# Patient Record
Sex: FEMALE | Race: WHITE | Employment: OTHER | URBAN - METROPOLITAN AREA
[De-identification: names, ages, dates, MRNs, and addresses within clinical notes are randomized per-mention and may not be internally consistent; named-entity substitution may affect disease eponyms.]

---

## 2019-12-03 ENCOUNTER — HOSPITAL ENCOUNTER (OUTPATIENT)
Dept: PREADMISSION TESTING | Age: 74
Discharge: HOME | End: 2019-12-03
Payer: MEDICARE

## 2019-12-03 VITALS — WEIGHT: 262 LBS | HEIGHT: 68 IN | BODY MASS INDEX: 39.71 KG/M2

## 2019-12-03 LAB
ALBUMIN SERPL-MCNC: 3.8 G/DL (ref 3.4–5)
ALBUMIN/GLOB SERPL: 1.3 {RATIO} (ref 0.8–1.7)
ALP SERPL-CCNC: 85 U/L (ref 45–117)
ALT SERPL-CCNC: 31 U/L (ref 13–56)
ANION GAP SERPL CALC-SCNC: 7 MMOL/L (ref 3–18)
APPEARANCE UR: ABNORMAL
APTT PPP: 31.9 SEC (ref 23–36.4)
AST SERPL-CCNC: 20 U/L (ref 10–38)
ATRIAL RATE: 70 BPM
BACTERIA URNS QL MICRO: ABNORMAL /HPF
BASOPHILS # BLD: 0 K/UL (ref 0–0.1)
BASOPHILS NFR BLD: 1 % (ref 0–2)
BILIRUB SERPL-MCNC: 0.5 MG/DL (ref 0.2–1)
BILIRUB UR QL: NEGATIVE
BUN SERPL-MCNC: 27 MG/DL (ref 7–18)
BUN/CREAT SERPL: 29 (ref 12–20)
CALCIUM SERPL-MCNC: 9.3 MG/DL (ref 8.5–10.1)
CALCULATED P AXIS, ECG09: 39 DEGREES
CALCULATED R AXIS, ECG10: 53 DEGREES
CALCULATED T AXIS, ECG11: 71 DEGREES
CAOX CRY URNS QL MICRO: ABNORMAL
CHLORIDE SERPL-SCNC: 106 MMOL/L (ref 100–111)
CO2 SERPL-SCNC: 28 MMOL/L (ref 21–32)
COLOR UR: YELLOW
CREAT SERPL-MCNC: 0.93 MG/DL (ref 0.6–1.3)
DIAGNOSIS, 93000: NORMAL
DIFFERENTIAL METHOD BLD: ABNORMAL
EOSINOPHIL # BLD: 0.1 K/UL (ref 0–0.4)
EOSINOPHIL NFR BLD: 2 % (ref 0–5)
EPITH CASTS URNS QL MICRO: ABNORMAL /LPF (ref 0–5)
ERYTHROCYTE [DISTWIDTH] IN BLOOD BY AUTOMATED COUNT: 14.6 % (ref 11.6–14.5)
ERYTHROCYTE [SEDIMENTATION RATE] IN BLOOD: 6 MM/HR (ref 0–30)
EST. AVERAGE GLUCOSE BLD GHB EST-MCNC: 117 MG/DL
GLOBULIN SER CALC-MCNC: 3 G/DL (ref 2–4)
GLUCOSE SERPL-MCNC: 91 MG/DL (ref 74–99)
GLUCOSE UR STRIP.AUTO-MCNC: NEGATIVE MG/DL
HBA1C MFR BLD: 5.7 % (ref 4.2–5.6)
HCT VFR BLD AUTO: 46.4 % (ref 35–45)
HGB BLD-MCNC: 14.2 G/DL (ref 12–16)
HGB UR QL STRIP: NEGATIVE
INR PPP: 0.9 (ref 0.8–1.2)
KETONES UR QL STRIP.AUTO: NEGATIVE MG/DL
LEUKOCYTE ESTERASE UR QL STRIP.AUTO: ABNORMAL
LYMPHOCYTES # BLD: 1.8 K/UL (ref 0.9–3.6)
LYMPHOCYTES NFR BLD: 36 % (ref 21–52)
MCH RBC QN AUTO: 26.1 PG (ref 24–34)
MCHC RBC AUTO-ENTMCNC: 30.6 G/DL (ref 31–37)
MCV RBC AUTO: 85.1 FL (ref 74–97)
MONOCYTES # BLD: 0.5 K/UL (ref 0.05–1.2)
MONOCYTES NFR BLD: 10 % (ref 3–10)
NEUTS SEG # BLD: 2.6 K/UL (ref 1.8–8)
NEUTS SEG NFR BLD: 51 % (ref 40–73)
NITRITE UR QL STRIP.AUTO: NEGATIVE
P-R INTERVAL, ECG05: 212 MS
PH UR STRIP: 5 [PH] (ref 5–8)
PLATELET # BLD AUTO: 228 K/UL (ref 135–420)
PMV BLD AUTO: 9.7 FL (ref 9.2–11.8)
POTASSIUM SERPL-SCNC: 4.7 MMOL/L (ref 3.5–5.5)
PROT SERPL-MCNC: 6.8 G/DL (ref 6.4–8.2)
PROT UR STRIP-MCNC: NEGATIVE MG/DL
PROTHROMBIN TIME: 12.4 SEC (ref 11.5–15.2)
Q-T INTERVAL, ECG07: 406 MS
QRS DURATION, ECG06: 92 MS
QTC CALCULATION (BEZET), ECG08: 438 MS
RBC # BLD AUTO: 5.45 M/UL (ref 4.2–5.3)
RBC #/AREA URNS HPF: ABNORMAL /HPF (ref 0–5)
SODIUM SERPL-SCNC: 141 MMOL/L (ref 136–145)
SP GR UR REFRACTOMETRY: 1.03 (ref 1–1.03)
UROBILINOGEN UR QL STRIP.AUTO: 0.2 EU/DL (ref 0.2–1)
VENTRICULAR RATE, ECG03: 70 BPM
WBC # BLD AUTO: 5.1 K/UL (ref 4.6–13.2)
WBC URNS QL MICRO: ABNORMAL /HPF (ref 0–5)

## 2019-12-03 PROCEDURE — 85610 PROTHROMBIN TIME: CPT

## 2019-12-03 PROCEDURE — 85730 THROMBOPLASTIN TIME PARTIAL: CPT

## 2019-12-03 PROCEDURE — 80053 COMPREHEN METABOLIC PANEL: CPT

## 2019-12-03 PROCEDURE — 81001 URINALYSIS AUTO W/SCOPE: CPT

## 2019-12-03 PROCEDURE — 85652 RBC SED RATE AUTOMATED: CPT

## 2019-12-03 PROCEDURE — 85025 COMPLETE CBC W/AUTO DIFF WBC: CPT

## 2019-12-03 PROCEDURE — 83036 HEMOGLOBIN GLYCOSYLATED A1C: CPT

## 2019-12-03 PROCEDURE — 93005 ELECTROCARDIOGRAM TRACING: CPT

## 2019-12-03 RX ORDER — LEVOTHYROXINE SODIUM 175 UG/1
175 TABLET ORAL EVERY OTHER DAY
Status: ON HOLD | COMMUNITY
End: 2019-12-17 | Stop reason: CLARIF

## 2019-12-03 RX ORDER — PHENOL/SODIUM PHENOLATE
20 AEROSOL, SPRAY (ML) MUCOUS MEMBRANE DAILY
COMMUNITY

## 2019-12-03 RX ORDER — SODIUM CHLORIDE, SODIUM LACTATE, POTASSIUM CHLORIDE, CALCIUM CHLORIDE 600; 310; 30; 20 MG/100ML; MG/100ML; MG/100ML; MG/100ML
125 INJECTION, SOLUTION INTRAVENOUS CONTINUOUS
Status: CANCELLED | OUTPATIENT
Start: 2019-12-03

## 2019-12-03 RX ORDER — DONEPEZIL HYDROCHLORIDE 10 MG/1
10 TABLET, FILM COATED ORAL
COMMUNITY

## 2019-12-03 RX ORDER — NORTRIPTYLINE HYDROCHLORIDE 75 MG/1
75 CAPSULE ORAL
COMMUNITY

## 2019-12-03 RX ORDER — CEFAZOLIN SODIUM/WATER 2 G/20 ML
2 SYRINGE (ML) INTRAVENOUS ONCE
Status: CANCELLED | OUTPATIENT
Start: 2019-12-03 | End: 2019-12-03

## 2019-12-03 RX ORDER — GLUCOSAMINE/CHONDR SU A SOD 750-600 MG
1 TABLET ORAL DAILY
COMMUNITY

## 2019-12-03 RX ORDER — SIMVASTATIN 80 MG/1
80 TABLET, FILM COATED ORAL DAILY
COMMUNITY

## 2019-12-03 RX ORDER — LEVOTHYROXINE SODIUM 200 UG/1
200 TABLET ORAL DAILY
COMMUNITY

## 2019-12-03 NOTE — PERIOP NOTES
Pt denies sleep apnea.   \"I had a stress test and an echo, and the results would be with Dr. Loyola Severin office, I don't remember when and where\"

## 2019-12-03 NOTE — PERIOP NOTES
At the time of the PAT interview, patient did not meet special population criteria. Dr Yuliana Collins office called, spoke to [facility], left message to request an updated surgical posting with a MD order for operative procedure without abbreviations.

## 2019-12-05 LAB
BACTERIA SPEC CULT: NORMAL
BACTERIA SPEC CULT: NORMAL
SERVICE CMNT-IMP: NORMAL

## 2019-12-12 NOTE — PERIOP NOTES
Fax over posting sheet tot office requesting new posting sheets without abbreviation. Written as TLIF.

## 2019-12-15 NOTE — H&P
History and Physical    Patient: Hamlet Bird MRN: [id number]  SSN: [id number]    YOB: 1945  Age: 76 y.o. Sex: female      Subjective:      Hamlet Bird is a 76 y.o. female who presents with leg neurogenic pain that has failed conservative management. The patient has severe quality of life disruption and has functional limitations. Past Medical History:   Diagnosis Date    Arthritis     Chronic pain     lower back and left leg    GERD (gastroesophageal reflux disease)     Psychiatric disorder     depression    Stroke ([facility] [address] 2004    mini stroke    Thyroid disease     hypo     Past Surgical History:   Procedure Laterality Date    HX CATARACT REMOVAL Bilateral     HX ORTHOPAEDIC Left 1974    Exc proximal radial head    TOTAL HIP ARTHROPLASTY Left 2019    TOTAL HIP ARTHROPLASTY Right 2017    TOTAL KNEE ARTHROPLASTY Bilateral 2011      No family history on file. Social History     Tobacco Use    Smoking status: Never Smoker    Smokeless tobacco: Never Used   Substance Use Topics    Alcohol use: Yes     Comment: 1 can of beer monthly      Prior to Admission medications    Medication Sig Start Date End Date Taking? Authorizing Provider   simvastatin (ZOCOR) 80 mg tablet Take 80 mg by mouth daily. Provider, Historical   levothyroxine (SYNTHROID) 175 mcg tablet Take 175 mcg by mouth every other day. Provider, Historical   levothyroxine (SYNTHROID) 200 mcg tablet Take 200 mcg by mouth every other day. Provider, Historical   Biotin 2,500 mcg cap Take 1 mg by mouth daily. Provider, Historical   Omeprazole delayed release (PRILOSEC D/R) 20 mg tablet Take 20 mg by mouth daily. Provider, Historical   nortriptyline (PAMELOR) 75 mg capsule Take 75 mg by mouth nightly. Provider, Historical   donepezil (ARICEPT) 10 mg tablet Take 10 mg by mouth nightly. Provider, Historical   multivit with calcium,iron,min [facility] MULTIPLE VITAMINS PO) Take 1 Tab by mouth daily. Provider, Historical        No Known Allergies    Review of Systems:    Uncomplicated. Objective: There were no vitals filed for this visit. Physical Exam:    The patient is alert and cooperative with the exam.  The patient is neurologically intact from L2-S2 and its respective myotomes and dermatomes. Skin is supple and amenable for surgery. CT OF THE LUMBAR SPINE WITHOUT CONTRAST   10/5/2019 11:06 AM       No prior studies the lumbar spine are available for comparison       HISTORY lumbar spondylolisthesis       COMMENTS   Multiple axial images of the lumbar spine are performed with additional sagittal and coronal reconstructions.       There is a grade 1 anterolisthesis of L4 upon L5 by approximately 5 mm. The remaining intervertebral levels demonstrate overall normal alignment. The vertebral body heights are intact. I do not appreciate displaced fracture or subluxation. Vacuum disc phenomenon is noted at all discs of the lumbar spine. There is facet hypertrophy greatest at the L4-5 and L5-S1 levels.       L1-2 and L2-3 do not demonstrate significant central neural foraminal stenosis. L3-4 there is a diffuse disc bulge encroach upon the ventral thecal sac combining with facet hypertrophy to create mild central narrowing   L4-5 there is a grade 1 anterolisthesis with associated disc uncovering. This combines with left greater than right facet hypertrophy creating mild to moderate central and left neural foraminal narrowing. L5-S1 there is a diffuse disc bulge with some calcification encroaching upon the ventral thecal sac. There is mild to moderate bilateral facet hypertrophy                   Assessment:     Lumbar 4-5 spondylolithesis with stenosis    Plan:     In summary, this is a patient with clinical and radiographic evidence of severe lumbar stenosis with instability who has exhausted all non-operative modalities for symptomatic management.  The patient has significant quality of life and functional limitations. I've recommended that the surgical option of an open surgical decompression and instrumented fusion of L4-5. Ive explained the risks and benefits of the operation to the patient at length, including but not limited to surgical site infection (both superficial and deep, potentially requiring reoperation and prolonged antibiotic therapy), neurological injury and dural tears with resultant CSF leakage (short term and long term). Rare medical complications of anesthesia such as MI, VTE, stroke, pneumonia, post-operative delirium, UTI, post-operative blindness and even death. The patient may sufficiently lose enough blood to warrant and blood transfusion. Long-term complications such as stiffness, adjacent joint arthrosis, non-union, hardware failure, recurrent symptoms and failure to resolve symptoms completely. Ive also explained to the patient the post-operative rehabilitation protocol and restrictions to the patient. The patient has understood these risks and has provided me with informed consent FOR Lumbar 4-5 DECOMPRESSION AND INSTRUMENTED FUSION, with left iliac crest bone marrow aspiration.     Signed By: Christian Crowley MD     December 15, 2019

## 2019-12-16 ENCOUNTER — ANESTHESIA EVENT (OUTPATIENT)
Dept: SURGERY | Age: 74
DRG: 455 | End: 2019-12-16
Payer: MEDICARE

## 2019-12-17 ENCOUNTER — APPOINTMENT (OUTPATIENT)
Dept: GENERAL RADIOLOGY | Age: 74
DRG: 455 | End: 2019-12-17
Attending: ORTHOPAEDIC SURGERY
Payer: MEDICARE

## 2019-12-17 ENCOUNTER — HOSPITAL ENCOUNTER (INPATIENT)
Age: 74
LOS: 1 days | Discharge: HOME HEALTH | DRG: 455 | End: 2019-12-18
Attending: ORTHOPAEDIC SURGERY | Admitting: ORTHOPAEDIC SURGERY
Payer: MEDICARE

## 2019-12-17 ENCOUNTER — ANESTHESIA (OUTPATIENT)
Dept: SURGERY | Age: 74
DRG: 455 | End: 2019-12-17
Payer: MEDICARE

## 2019-12-17 DIAGNOSIS — M48.062 LUMBAR STENOSIS WITH NEUROGENIC CLAUDICATION: Primary | ICD-10-CM

## 2019-12-17 LAB
ABO + RH BLD: NORMAL
BLOOD GROUP ANTIBODIES SERPL: NORMAL
SPECIMEN EXP DATE BLD: NORMAL

## 2019-12-17 PROCEDURE — 97161 PT EVAL LOW COMPLEX 20 MIN: CPT

## 2019-12-17 PROCEDURE — 74011250636 HC RX REV CODE- 250/636: Performed by: NURSE ANESTHETIST, CERTIFIED REGISTERED

## 2019-12-17 PROCEDURE — C1713 ANCHOR/SCREW BN/BN,TIS/BN: HCPCS | Performed by: ORTHOPAEDIC SURGERY

## 2019-12-17 PROCEDURE — 77030020407 HC IV BLD WRMR ST 3M -A: Performed by: ANESTHESIOLOGY

## 2019-12-17 PROCEDURE — 74011250636 HC RX REV CODE- 250/636: Performed by: ANESTHESIOLOGY

## 2019-12-17 PROCEDURE — 77030037875 HC DRSG MEPILEX <16IN BORD MOLN -A: Performed by: ORTHOPAEDIC SURGERY

## 2019-12-17 PROCEDURE — 07DR0ZZ EXTRACTION OF ILIAC BONE MARROW, OPEN APPROACH: ICD-10-PCS | Performed by: ORTHOPAEDIC SURGERY

## 2019-12-17 PROCEDURE — 77030020269 HC MISC IMPL: Performed by: ORTHOPAEDIC SURGERY

## 2019-12-17 PROCEDURE — C9290 INJ, BUPIVACAINE LIPOSOME: HCPCS | Performed by: ORTHOPAEDIC SURGERY

## 2019-12-17 PROCEDURE — 74011000258 HC RX REV CODE- 258: Performed by: ORTHOPAEDIC SURGERY

## 2019-12-17 PROCEDURE — 77030029397 HC SHTH SBS BPLR SEALR MEDT -F: Performed by: ORTHOPAEDIC SURGERY

## 2019-12-17 PROCEDURE — 77030011256 HC DRSG MEPILEX <16IN NO BORD MOLN -A: Performed by: ORTHOPAEDIC SURGERY

## 2019-12-17 PROCEDURE — 77030020782 HC GWN BAIR PAWS FLX 3M -B: Performed by: ORTHOPAEDIC SURGERY

## 2019-12-17 PROCEDURE — 77030020262 HC SOL INJ SOD CL 0.9% 100ML: Performed by: ORTHOPAEDIC SURGERY

## 2019-12-17 PROCEDURE — 77030039267 HC ADH SKN EXOFIN S2SG -B: Performed by: ORTHOPAEDIC SURGERY

## 2019-12-17 PROCEDURE — 36415 COLL VENOUS BLD VENIPUNCTURE: CPT

## 2019-12-17 PROCEDURE — 77030031140 HC SUT VCRL3 J&J -A: Performed by: ORTHOPAEDIC SURGERY

## 2019-12-17 PROCEDURE — 77030018836 HC SOL IRR NACL ICUM -A: Performed by: ORTHOPAEDIC SURGERY

## 2019-12-17 PROCEDURE — 77030008477 HC STYL SATN SLP COVD -A: Performed by: ANESTHESIOLOGY

## 2019-12-17 PROCEDURE — 97530 THERAPEUTIC ACTIVITIES: CPT

## 2019-12-17 PROCEDURE — 77030019908 HC STETH ESOPH SIMS -A: Performed by: ANESTHESIOLOGY

## 2019-12-17 PROCEDURE — 77030020268 HC MISC GENERAL SUPPLY: Performed by: ORTHOPAEDIC SURGERY

## 2019-12-17 PROCEDURE — 0SB20ZZ EXCISION OF LUMBAR VERTEBRAL DISC, OPEN APPROACH: ICD-10-PCS | Performed by: ORTHOPAEDIC SURGERY

## 2019-12-17 PROCEDURE — 77030017009 HC DRN WNDE BARD -E: Performed by: ORTHOPAEDIC SURGERY

## 2019-12-17 PROCEDURE — 74011000272 HC RX REV CODE- 272: Performed by: ORTHOPAEDIC SURGERY

## 2019-12-17 PROCEDURE — 76060000039 HC ANESTHESIA 4 TO 4.5 HR: Performed by: ORTHOPAEDIC SURGERY

## 2019-12-17 PROCEDURE — 77030014650 HC SEAL MTRX FLOSEL BAXT -C: Performed by: ORTHOPAEDIC SURGERY

## 2019-12-17 PROCEDURE — 74011000250 HC RX REV CODE- 250: Performed by: ORTHOPAEDIC SURGERY

## 2019-12-17 PROCEDURE — 76210000006 HC OR PH I REC 0.5 TO 1 HR: Performed by: ORTHOPAEDIC SURGERY

## 2019-12-17 PROCEDURE — 77030014007 HC SPNG HEMSTAT J&J -B: Performed by: ORTHOPAEDIC SURGERY

## 2019-12-17 PROCEDURE — 74011250637 HC RX REV CODE- 250/637: Performed by: ORTHOPAEDIC SURGERY

## 2019-12-17 PROCEDURE — 76010000135 HC OR TIME 4 TO 4.5 HR: Performed by: ORTHOPAEDIC SURGERY

## 2019-12-17 PROCEDURE — 77030004402 HC BUR NEUR STRY -C: Performed by: ORTHOPAEDIC SURGERY

## 2019-12-17 PROCEDURE — 77030013079 HC BLNKT BAIR HGGR 3M -A: Performed by: ANESTHESIOLOGY

## 2019-12-17 PROCEDURE — 74011250636 HC RX REV CODE- 250/636: Performed by: ORTHOPAEDIC SURGERY

## 2019-12-17 PROCEDURE — 77030006643: Performed by: ANESTHESIOLOGY

## 2019-12-17 PROCEDURE — 00NY0ZZ RELEASE LUMBAR SPINAL CORD, OPEN APPROACH: ICD-10-PCS | Performed by: ORTHOPAEDIC SURGERY

## 2019-12-17 PROCEDURE — 77030031139 HC SUT VCRL2 J&J -A: Performed by: ORTHOPAEDIC SURGERY

## 2019-12-17 PROCEDURE — 0SG00AJ FUSION OF LUMBAR VERTEBRAL JOINT WITH INTERBODY FUSION DEVICE, POSTERIOR APPROACH, ANTERIOR COLUMN, OPEN APPROACH: ICD-10-PCS | Performed by: ORTHOPAEDIC SURGERY

## 2019-12-17 PROCEDURE — 77030034475 HC MISC IMPL SPN: Performed by: ORTHOPAEDIC SURGERY

## 2019-12-17 PROCEDURE — 72100 X-RAY EXAM L-S SPINE 2/3 VWS: CPT

## 2019-12-17 PROCEDURE — 0SG0071 FUSION OF LUMBAR VERTEBRAL JOINT WITH AUTOLOGOUS TISSUE SUBSTITUTE, POSTERIOR APPROACH, POSTERIOR COLUMN, OPEN APPROACH: ICD-10-PCS | Performed by: ORTHOPAEDIC SURGERY

## 2019-12-17 PROCEDURE — 77030020010 HC FCPS BPLR DISP INLC -E: Performed by: ORTHOPAEDIC SURGERY

## 2019-12-17 PROCEDURE — 65270000029 HC RM PRIVATE

## 2019-12-17 PROCEDURE — 74011000250 HC RX REV CODE- 250: Performed by: NURSE ANESTHETIST, CERTIFIED REGISTERED

## 2019-12-17 PROCEDURE — 77030002916 HC SUT ETHLN J&J -A: Performed by: ORTHOPAEDIC SURGERY

## 2019-12-17 PROCEDURE — 86900 BLOOD TYPING SEROLOGIC ABO: CPT

## 2019-12-17 PROCEDURE — 74011000258 HC RX REV CODE- 258: Performed by: NURSE ANESTHETIST, CERTIFIED REGISTERED

## 2019-12-17 PROCEDURE — 01NB0ZZ RELEASE LUMBAR NERVE, OPEN APPROACH: ICD-10-PCS | Performed by: ORTHOPAEDIC SURGERY

## 2019-12-17 PROCEDURE — 77030002933 HC SUT MCRYL J&J -A: Performed by: ORTHOPAEDIC SURGERY

## 2019-12-17 PROCEDURE — 77030008462 HC STPLR SKN PROX J&J -A: Performed by: ORTHOPAEDIC SURGERY

## 2019-12-17 PROCEDURE — 77030040504 HC DRN WND MDII -B: Performed by: ORTHOPAEDIC SURGERY

## 2019-12-17 PROCEDURE — 77030040361 HC SLV COMPR DVT MDII -B: Performed by: ORTHOPAEDIC SURGERY

## 2019-12-17 DEVICE — GRAFT BNE SUB 5CC 2-4MM GROWTH FACT ALLGRFT OSTEOAMP: Type: IMPLANTABLE DEVICE | Site: SPINE LUMBAR | Status: FUNCTIONAL

## 2019-12-17 DEVICE — GRAFT BNE SUB 15GM GRN CA COMPND PTTY AND SILICATE BASE INJ: Type: IMPLANTABLE DEVICE | Site: SPINE LUMBAR | Status: FUNCTIONAL

## 2019-12-17 DEVICE — GRAFT BNE SUB 7.5GM PTTY SYN SIGNAFUSE: Type: IMPLANTABLE DEVICE | Site: SPINE LUMBAR | Status: FUNCTIONAL

## 2019-12-17 RX ORDER — HYDROMORPHONE HYDROCHLORIDE 1 MG/ML
1 INJECTION, SOLUTION INTRAMUSCULAR; INTRAVENOUS; SUBCUTANEOUS
Status: DISCONTINUED | OUTPATIENT
Start: 2019-12-17 | End: 2019-12-18 | Stop reason: HOSPADM

## 2019-12-17 RX ORDER — DIPHENHYDRAMINE HYDROCHLORIDE 50 MG/ML
12.5 INJECTION, SOLUTION INTRAMUSCULAR; INTRAVENOUS
Status: DISCONTINUED | OUTPATIENT
Start: 2019-12-17 | End: 2019-12-18 | Stop reason: HOSPADM

## 2019-12-17 RX ORDER — CELECOXIB 100 MG/1
200 CAPSULE ORAL 2 TIMES DAILY
Status: DISCONTINUED | OUTPATIENT
Start: 2019-12-17 | End: 2019-12-18 | Stop reason: HOSPADM

## 2019-12-17 RX ORDER — SODIUM CHLORIDE 0.9 % (FLUSH) 0.9 %
5-40 SYRINGE (ML) INJECTION AS NEEDED
Status: DISCONTINUED | OUTPATIENT
Start: 2019-12-17 | End: 2019-12-18 | Stop reason: HOSPADM

## 2019-12-17 RX ORDER — EPHEDRINE SULFATE/0.9% NACL/PF 50 MG/5 ML
SYRINGE (ML) INTRAVENOUS AS NEEDED
Status: DISCONTINUED | OUTPATIENT
Start: 2019-12-17 | End: 2019-12-17 | Stop reason: HOSPADM

## 2019-12-17 RX ORDER — ALBUTEROL SULFATE 0.83 MG/ML
2.5 SOLUTION RESPIRATORY (INHALATION) AS NEEDED
Status: DISCONTINUED | OUTPATIENT
Start: 2019-12-17 | End: 2019-12-17 | Stop reason: HOSPADM

## 2019-12-17 RX ORDER — PHENOL/SODIUM PHENOLATE
20 AEROSOL, SPRAY (ML) MUCOUS MEMBRANE DAILY
Status: DISCONTINUED | OUTPATIENT
Start: 2019-12-18 | End: 2019-12-17 | Stop reason: CLARIF

## 2019-12-17 RX ORDER — OXYCODONE AND ACETAMINOPHEN 5; 325 MG/1; MG/1
1 TABLET ORAL AS NEEDED
Status: DISCONTINUED | OUTPATIENT
Start: 2019-12-17 | End: 2019-12-17 | Stop reason: HOSPADM

## 2019-12-17 RX ORDER — HYDROMORPHONE HYDROCHLORIDE 2 MG/ML
0.5 INJECTION, SOLUTION INTRAMUSCULAR; INTRAVENOUS; SUBCUTANEOUS
Status: DISCONTINUED | OUTPATIENT
Start: 2019-12-17 | End: 2019-12-17 | Stop reason: HOSPADM

## 2019-12-17 RX ORDER — FENTANYL CITRATE 50 UG/ML
INJECTION, SOLUTION INTRAMUSCULAR; INTRAVENOUS AS NEEDED
Status: DISCONTINUED | OUTPATIENT
Start: 2019-12-17 | End: 2019-12-17 | Stop reason: HOSPADM

## 2019-12-17 RX ORDER — PROPOFOL 10 MG/ML
INJECTION, EMULSION INTRAVENOUS AS NEEDED
Status: DISCONTINUED | OUTPATIENT
Start: 2019-12-17 | End: 2019-12-17 | Stop reason: HOSPADM

## 2019-12-17 RX ORDER — ACETAMINOPHEN 325 MG/1
650 TABLET ORAL
Status: DISCONTINUED | OUTPATIENT
Start: 2019-12-17 | End: 2019-12-18 | Stop reason: HOSPADM

## 2019-12-17 RX ORDER — SODIUM CHLORIDE 0.9 % (FLUSH) 0.9 %
5-40 SYRINGE (ML) INJECTION EVERY 8 HOURS
Status: DISCONTINUED | OUTPATIENT
Start: 2019-12-17 | End: 2019-12-18 | Stop reason: HOSPADM

## 2019-12-17 RX ORDER — NALOXONE HYDROCHLORIDE 0.4 MG/ML
0.2 INJECTION, SOLUTION INTRAMUSCULAR; INTRAVENOUS; SUBCUTANEOUS AS NEEDED
Status: DISCONTINUED | OUTPATIENT
Start: 2019-12-17 | End: 2019-12-17 | Stop reason: HOSPADM

## 2019-12-17 RX ORDER — CEFAZOLIN SODIUM/WATER 2 G/20 ML
2 SYRINGE (ML) INTRAVENOUS EVERY 8 HOURS
Status: COMPLETED | OUTPATIENT
Start: 2019-12-17 | End: 2019-12-18

## 2019-12-17 RX ORDER — SIMVASTATIN 40 MG/1
80 TABLET, FILM COATED ORAL DAILY
Status: DISCONTINUED | OUTPATIENT
Start: 2019-12-18 | End: 2019-12-18 | Stop reason: HOSPADM

## 2019-12-17 RX ORDER — DEXAMETHASONE SODIUM PHOSPHATE 4 MG/ML
INJECTION, SOLUTION INTRA-ARTICULAR; INTRALESIONAL; INTRAMUSCULAR; INTRAVENOUS; SOFT TISSUE AS NEEDED
Status: DISCONTINUED | OUTPATIENT
Start: 2019-12-17 | End: 2019-12-17 | Stop reason: HOSPADM

## 2019-12-17 RX ORDER — ROCURONIUM BROMIDE 10 MG/ML
INJECTION, SOLUTION INTRAVENOUS AS NEEDED
Status: DISCONTINUED | OUTPATIENT
Start: 2019-12-17 | End: 2019-12-17 | Stop reason: HOSPADM

## 2019-12-17 RX ORDER — NORTRIPTYLINE HYDROCHLORIDE 25 MG/1
75 CAPSULE ORAL
Status: DISCONTINUED | OUTPATIENT
Start: 2019-12-17 | End: 2019-12-18 | Stop reason: HOSPADM

## 2019-12-17 RX ORDER — PROMETHAZINE HYDROCHLORIDE 25 MG/1
25 TABLET ORAL
Status: DISCONTINUED | OUTPATIENT
Start: 2019-12-17 | End: 2019-12-18 | Stop reason: HOSPADM

## 2019-12-17 RX ORDER — PROPOFOL 10 MG/ML
INJECTION, EMULSION INTRAVENOUS
Status: DISCONTINUED | OUTPATIENT
Start: 2019-12-17 | End: 2019-12-17 | Stop reason: HOSPADM

## 2019-12-17 RX ORDER — MIDAZOLAM HYDROCHLORIDE 1 MG/ML
INJECTION, SOLUTION INTRAMUSCULAR; INTRAVENOUS AS NEEDED
Status: DISCONTINUED | OUTPATIENT
Start: 2019-12-17 | End: 2019-12-17 | Stop reason: HOSPADM

## 2019-12-17 RX ORDER — SODIUM CHLORIDE, SODIUM LACTATE, POTASSIUM CHLORIDE, CALCIUM CHLORIDE 600; 310; 30; 20 MG/100ML; MG/100ML; MG/100ML; MG/100ML
125 INJECTION, SOLUTION INTRAVENOUS CONTINUOUS
Status: DISCONTINUED | OUTPATIENT
Start: 2019-12-17 | End: 2019-12-18 | Stop reason: HOSPADM

## 2019-12-17 RX ORDER — SODIUM CHLORIDE, SODIUM LACTATE, POTASSIUM CHLORIDE, CALCIUM CHLORIDE 600; 310; 30; 20 MG/100ML; MG/100ML; MG/100ML; MG/100ML
1000 INJECTION, SOLUTION INTRAVENOUS CONTINUOUS
Status: DISCONTINUED | OUTPATIENT
Start: 2019-12-17 | End: 2019-12-17 | Stop reason: HOSPADM

## 2019-12-17 RX ORDER — DIPHENHYDRAMINE HYDROCHLORIDE 50 MG/ML
12.5 INJECTION, SOLUTION INTRAMUSCULAR; INTRAVENOUS
Status: DISCONTINUED | OUTPATIENT
Start: 2019-12-17 | End: 2019-12-17 | Stop reason: HOSPADM

## 2019-12-17 RX ORDER — LIDOCAINE HYDROCHLORIDE 20 MG/ML
INJECTION, SOLUTION EPIDURAL; INFILTRATION; INTRACAUDAL; PERINEURAL AS NEEDED
Status: DISCONTINUED | OUTPATIENT
Start: 2019-12-17 | End: 2019-12-17 | Stop reason: HOSPADM

## 2019-12-17 RX ORDER — CYCLOBENZAPRINE HCL 10 MG
5 TABLET ORAL
Status: DISCONTINUED | OUTPATIENT
Start: 2019-12-17 | End: 2019-12-18 | Stop reason: HOSPADM

## 2019-12-17 RX ORDER — LEVOTHYROXINE SODIUM 100 UG/1
200 TABLET ORAL DAILY
Status: DISCONTINUED | OUTPATIENT
Start: 2019-12-18 | End: 2019-12-18 | Stop reason: HOSPADM

## 2019-12-17 RX ORDER — PANTOPRAZOLE SODIUM 40 MG/1
40 TABLET, DELAYED RELEASE ORAL
Status: DISCONTINUED | OUTPATIENT
Start: 2019-12-18 | End: 2019-12-18 | Stop reason: HOSPADM

## 2019-12-17 RX ORDER — ONDANSETRON 2 MG/ML
INJECTION INTRAMUSCULAR; INTRAVENOUS AS NEEDED
Status: DISCONTINUED | OUTPATIENT
Start: 2019-12-17 | End: 2019-12-17 | Stop reason: HOSPADM

## 2019-12-17 RX ORDER — GABAPENTIN 100 MG/1
100 CAPSULE ORAL 3 TIMES DAILY
Status: DISCONTINUED | OUTPATIENT
Start: 2019-12-17 | End: 2019-12-18 | Stop reason: HOSPADM

## 2019-12-17 RX ORDER — DONEPEZIL HYDROCHLORIDE 5 MG/1
10 TABLET, FILM COATED ORAL
Status: DISCONTINUED | OUTPATIENT
Start: 2019-12-17 | End: 2019-12-18 | Stop reason: HOSPADM

## 2019-12-17 RX ORDER — FLUMAZENIL 0.1 MG/ML
0.2 INJECTION INTRAVENOUS
Status: DISCONTINUED | OUTPATIENT
Start: 2019-12-17 | End: 2019-12-17 | Stop reason: HOSPADM

## 2019-12-17 RX ORDER — NALOXONE HYDROCHLORIDE 0.4 MG/ML
0.4 INJECTION, SOLUTION INTRAMUSCULAR; INTRAVENOUS; SUBCUTANEOUS AS NEEDED
Status: DISCONTINUED | OUTPATIENT
Start: 2019-12-17 | End: 2019-12-18 | Stop reason: HOSPADM

## 2019-12-17 RX ORDER — OXYCODONE AND ACETAMINOPHEN 5; 325 MG/1; MG/1
1 TABLET ORAL
Status: DISCONTINUED | OUTPATIENT
Start: 2019-12-17 | End: 2019-12-18 | Stop reason: HOSPADM

## 2019-12-17 RX ORDER — CEFAZOLIN SODIUM/WATER 2 G/20 ML
2 SYRINGE (ML) INTRAVENOUS ONCE
Status: COMPLETED | OUTPATIENT
Start: 2019-12-17 | End: 2019-12-17

## 2019-12-17 RX ORDER — FENTANYL CITRATE 50 UG/ML
25 INJECTION, SOLUTION INTRAMUSCULAR; INTRAVENOUS AS NEEDED
Status: DISCONTINUED | OUTPATIENT
Start: 2019-12-17 | End: 2019-12-17 | Stop reason: HOSPADM

## 2019-12-17 RX ORDER — ACETAMINOPHEN 10 MG/ML
1000 INJECTION, SOLUTION INTRAVENOUS ONCE
Status: COMPLETED | OUTPATIENT
Start: 2019-12-17 | End: 2019-12-17

## 2019-12-17 RX ORDER — DEXAMETHASONE SODIUM PHOSPHATE 4 MG/ML
4 INJECTION, SOLUTION INTRA-ARTICULAR; INTRALESIONAL; INTRAMUSCULAR; INTRAVENOUS; SOFT TISSUE EVERY 8 HOURS
Status: COMPLETED | OUTPATIENT
Start: 2019-12-17 | End: 2019-12-18

## 2019-12-17 RX ADMIN — OXYCODONE HYDROCHLORIDE AND ACETAMINOPHEN 1 TABLET: 5; 325 TABLET ORAL at 17:12

## 2019-12-17 RX ADMIN — Medication 15 MG: at 08:10

## 2019-12-17 RX ADMIN — PHENYLEPHRINE HYDROCHLORIDE 100 MCG: 10 INJECTION INTRAVENOUS at 08:05

## 2019-12-17 RX ADMIN — DEXAMETHASONE SODIUM PHOSPHATE 4 MG: 4 INJECTION, SOLUTION INTRAMUSCULAR; INTRAVENOUS at 23:25

## 2019-12-17 RX ADMIN — FENTANYL CITRATE 50 MCG: 50 INJECTION, SOLUTION INTRAMUSCULAR; INTRAVENOUS at 08:50

## 2019-12-17 RX ADMIN — CELECOXIB 200 MG: 100 CAPSULE ORAL at 20:58

## 2019-12-17 RX ADMIN — HYDROMORPHONE HYDROCHLORIDE 0.5 MG: 2 INJECTION, SOLUTION INTRAMUSCULAR; INTRAVENOUS; SUBCUTANEOUS at 11:53

## 2019-12-17 RX ADMIN — HYDROMORPHONE HYDROCHLORIDE 0.5 MG: 2 INJECTION, SOLUTION INTRAMUSCULAR; INTRAVENOUS; SUBCUTANEOUS at 12:03

## 2019-12-17 RX ADMIN — GABAPENTIN 100 MG: 100 CAPSULE ORAL at 21:02

## 2019-12-17 RX ADMIN — PHENYLEPHRINE HYDROCHLORIDE 200 MCG: 10 INJECTION INTRAVENOUS at 08:36

## 2019-12-17 RX ADMIN — Medication 10 ML: at 23:26

## 2019-12-17 RX ADMIN — PROPOFOL 125 MCG/KG/MIN: 10 INJECTION, EMULSION INTRAVENOUS at 07:46

## 2019-12-17 RX ADMIN — Medication 10 MG: at 08:15

## 2019-12-17 RX ADMIN — DEXAMETHASONE SODIUM PHOSPHATE 4 MG: 4 INJECTION, SOLUTION INTRAMUSCULAR; INTRAVENOUS at 15:28

## 2019-12-17 RX ADMIN — Medication 3 G: at 07:44

## 2019-12-17 RX ADMIN — ACETAMINOPHEN 1000 MG: 10 INJECTION, SOLUTION INTRAVENOUS at 07:46

## 2019-12-17 RX ADMIN — Medication 10 MG: at 08:12

## 2019-12-17 RX ADMIN — SODIUM CHLORIDE, SODIUM LACTATE, POTASSIUM CHLORIDE, AND CALCIUM CHLORIDE 125 ML/HR: 600; 310; 30; 20 INJECTION, SOLUTION INTRAVENOUS at 21:06

## 2019-12-17 RX ADMIN — SODIUM CHLORIDE, SODIUM LACTATE, POTASSIUM CHLORIDE, AND CALCIUM CHLORIDE 125 ML/HR: 600; 310; 30; 20 INJECTION, SOLUTION INTRAVENOUS at 06:35

## 2019-12-17 RX ADMIN — NORTRIPTYLINE HYDROCHLORIDE 75 MG: 25 CAPSULE ORAL at 21:02

## 2019-12-17 RX ADMIN — PHENYLEPHRINE HYDROCHLORIDE 100 MCG: 10 INJECTION INTRAVENOUS at 07:50

## 2019-12-17 RX ADMIN — Medication 15 MG: at 08:19

## 2019-12-17 RX ADMIN — Medication 30 MG: at 07:37

## 2019-12-17 RX ADMIN — MIDAZOLAM 2 MG: 1 INJECTION INTRAMUSCULAR; INTRAVENOUS at 07:28

## 2019-12-17 RX ADMIN — PHENYLEPHRINE HYDROCHLORIDE 200 MCG: 10 INJECTION INTRAVENOUS at 08:33

## 2019-12-17 RX ADMIN — SUGAMMADEX 200 MG: 100 INJECTION, SOLUTION INTRAVENOUS at 10:46

## 2019-12-17 RX ADMIN — DONEPEZIL HYDROCHLORIDE 10 MG: 5 TABLET, FILM COATED ORAL at 21:02

## 2019-12-17 RX ADMIN — DEXAMETHASONE SODIUM PHOSPHATE 8 MG: 4 INJECTION, SOLUTION INTRAMUSCULAR; INTRAVENOUS at 08:10

## 2019-12-17 RX ADMIN — OXYCODONE HYDROCHLORIDE AND ACETAMINOPHEN 1 TABLET: 5; 325 TABLET ORAL at 23:25

## 2019-12-17 RX ADMIN — GABAPENTIN 100 MG: 100 CAPSULE ORAL at 15:28

## 2019-12-17 RX ADMIN — Medication 2 G: at 23:25

## 2019-12-17 RX ADMIN — Medication 2 G: at 15:28

## 2019-12-17 RX ADMIN — PHENYLEPHRINE HYDROCHLORIDE 200 MCG: 10 INJECTION INTRAVENOUS at 09:47

## 2019-12-17 RX ADMIN — PHENYLEPHRINE HYDROCHLORIDE 100 MCG: 10 INJECTION INTRAVENOUS at 07:53

## 2019-12-17 RX ADMIN — SODIUM CHLORIDE, SODIUM LACTATE, POTASSIUM CHLORIDE, AND CALCIUM CHLORIDE: 600; 310; 30; 20 INJECTION, SOLUTION INTRAVENOUS at 08:52

## 2019-12-17 RX ADMIN — PROPOFOL 150 MG: 10 INJECTION, EMULSION INTRAVENOUS at 07:36

## 2019-12-17 RX ADMIN — LIDOCAINE HYDROCHLORIDE 100 MG: 20 INJECTION, SOLUTION EPIDURAL; INFILTRATION; INTRACAUDAL; PERINEURAL at 07:35

## 2019-12-17 RX ADMIN — HYDROMORPHONE HYDROCHLORIDE 0.5 MG: 2 INJECTION, SOLUTION INTRAMUSCULAR; INTRAVENOUS; SUBCUTANEOUS at 12:13

## 2019-12-17 RX ADMIN — TRANEXAMIC ACID 1 G: 100 INJECTION, SOLUTION INTRAVENOUS at 10:34

## 2019-12-17 RX ADMIN — FENTANYL CITRATE 50 MCG: 50 INJECTION, SOLUTION INTRAMUSCULAR; INTRAVENOUS at 11:05

## 2019-12-17 RX ADMIN — ONDANSETRON HYDROCHLORIDE 4 MG: 2 INJECTION INTRAMUSCULAR; INTRAVENOUS at 10:34

## 2019-12-17 RX ADMIN — FENTANYL CITRATE 100 MCG: 50 INJECTION, SOLUTION INTRAMUSCULAR; INTRAVENOUS at 07:34

## 2019-12-17 RX ADMIN — TRANEXAMIC ACID 1 G: 100 INJECTION, SOLUTION INTRAVENOUS at 08:05

## 2019-12-17 RX ADMIN — HYDROMORPHONE HYDROCHLORIDE 1 MG: 1 INJECTION, SOLUTION INTRAMUSCULAR; INTRAVENOUS; SUBCUTANEOUS at 14:07

## 2019-12-17 NOTE — PROGRESS NOTES
1315  Patient arrived to the floor from PACU in satisfactory condition. Dsg CDI, [place]. VS stable. Pain voiced at 8/10, will medicate per [facility] IS educated and encouraged. Shift POC reviewed with patient. Menu/meal times explained. Call/bell phone within reach. Will monitor for changes. 2000 Bedside and Verbal shift change report given to Jona Pope RN  (oncoming nurse) by Adiel Andino RN (offgoing nurse). Report included the following information SBAR, Kardex, MAR, Recent Results and Med Rec Status.

## 2019-12-17 NOTE — PERIOP NOTES
Patient assigned to room 202. [facility] RN to read [employer].

## 2019-12-17 NOTE — PROGRESS NOTES
Problem: Mobility Impaired (Adult and Pediatric)  Goal: *Acute Goals and Plan of Care (Insert Text)  Description  In 1-7 days pt will be able to perform:  STG  1. Bed mobility:  Demonstrate proper log-roll technique for safe initiation of rolling for OOB activities. 2.  Supine to sit to supine S with HR for meals. 3.  Sit to stand to sit S with RW/LSO in prep for ambulation. LT.  Gait:  Ambulate 150ft S with RW/LSO, for home/community mobility. 2.  Stair Negotiation:  Ascend/descend >3 steps CGA with HR for home entry. 3.  Activity tolerance: Tolerate up in chair 30 minutes-1 hour for ADLs. 4.  Patient/Family Education:  Patient/family to be independent with HEP for follow-up care and safe discharge. Note:   PHYSICAL THERAPY EVALUATION    Patient: Juana Hudson (84 y.o. female)  Date: 2019  Primary Diagnosis: Lumbar stenosis with neurogenic claudication [M48.062]  Procedure(s) (LRB):  SPINE DECOMPRESSION AND FUSION OF LUMBAR 4- LUMBAR 5 WITH TRANSFORAMINAL LUMBAR INTERBODY FUSION AND LEFT ILIAC CREST BONE MARROW ASPIRATION WITH NEUROMONITORING WITH C-ARM (N/A) Day of Surgery   Precautions:   Fall, Spinal, Back    ASSESSMENT :  Based on the objective data described below, the patient presents with decreased functional mobility and independence in regard to bed mobility, transfers, gt quality and tolerance, generalized strength, pain, balance, activity tolerance, stair negotiation and safety due to recent back surgery. Pt rating pain on numerical pain scale 2/10. Pt ed regarding log roll techniques and back precautions. Pt required CGA/min A for supine<>sidelying<>sit<>stand. Pt required vc for safe techniques. Pt able to participate in gt training w/ RW, WBAT, GB and CGA in hallway w/ antalgic pattern. Pt able to void on commode and perform own hygiene. Pt returned to supine in bed w/ all needs within reach, SCDs applied. Nurse Juan aware and visitor present.   Recommend [facility] upon hospital d/c. Patient will benefit from skilled intervention to address the above impairments. Patients rehabilitation potential is considered to be Good  Factors which may influence rehabilitation potential include:   []         None noted  []         Mental ability/status  []         Medical condition  []         Home/family situation and support systems  []         Safety awareness  [x]         Pain tolerance/management  []         Other:      PLAN :  Recommendations and Planned Interventions:  [x]           Bed Mobility Training             []    Neuromuscular Re-Education  [x]           Transfer Training                   []    Orthotic/Prosthetic Training  [x]           Gait Training                          []    Modalities  [x]           Therapeutic Exercises          []    Edema Management/Control  [x]           Therapeutic Activities            [x]    Patient and Family Training/Education  []           Other (comment):    Frequency/Duration: Patient will be followed by physical therapy twice daily to address goals. Discharge Recommendations: Home Health  Further Equipment Recommendations for Discharge: N/A     SUBJECTIVE:   Patient stated I am doing okay.     OBJECTIVE DATA SUMMARY:     Past Medical History:   Diagnosis Date    Arthritis     Chronic pain     lower back and left leg    GERD (gastroesophageal reflux disease)     Psychiatric disorder     depression    [facility]    mini stroke    Thyroid disease     hypo     Past Surgical History:   Procedure Laterality Date    HX CATARACT REMOVAL Bilateral     HX ORTHOPAEDIC Left 1974    Exc proximal radial head    TOTAL HIP ARTHROPLASTY Left 2019    TOTAL HIP ARTHROPLASTY Right 2017    TOTAL KNEE ARTHROPLASTY Bilateral 2011     Barriers to Learning/Limitations: None  Compensate with: visual, verbal, tactile, kinesthetic cues/model  Prior Level of Function/Home Situation:   Home Situation  Home Environment: Private residence  # Steps to Enter: 4  Rails to Enter: Yes  Hand Rails : Bilateral  One/Two Story Residence: One story  Living Alone: No  Support Systems: Spouse/Significant Other/Partner  Patient Expects to be Discharged to[de-identified] Private residence  Current DME Used/Available at Home: Cane, straight, Grab bars, Walker, rolling, Shower chair, Raised toilet seat  Critical Behavior:  Neurologic State: Alert; Appropriate for age  Orientation Level: Oriented X4  Cognition: Appropriate decision making; Appropriate for age attention/concentration; Appropriate safety awareness; Follows commands  Safety/Judgement: Awareness of environment  Psychosocial  Patient Behaviors: Calm; Cooperative  Skin Condition/Temp: Dry;Warm  Skin Integrity: Incision (comment)(back )  Skin Integumentary  Skin Color: Appropriate for ethnicity  Skin Condition/Temp: Dry;Warm  Skin Integrity: Incision (comment)(back )  Hair Growth: Absent  Varicosities: Present  Strength:    Strength: Generally decreased, functional  Tone & Sensation:   Tone: Normal  Sensation: Intact  Range Of Motion:  AROM: Generally decreased, functional  Functional Mobility:  Bed Mobility:  Rolling: Contact guard assistance(vc)  Supine to Sit: Contact guard assistance(vc)  Sit to Supine: Contact guard assistance;Minimum assistance; Additional time(vc)  Scooting: Contact guard assistance(vc)  Transfers:  Sit to Stand: Contact guard assistance(vc)  Stand to Sit: Contact guard assistance(vc)  Balance:   Sitting: Intact  Standing: Intact; With support  Ambulation/Gait Training:  Distance (ft): 5 Feet (ft)  Assistive Device: Walker, rolling  Ambulation - Level of Assistance: Contact guard assistance(vc)  Gait Abnormalities: Antalgic;Decreased step clearance  Base of Support: Widened  Stance: Weight shift;Time  Speed/Milly: Slow  Step Length: Left shortened;Right shortened  Swing Pattern: Left asymmetrical;Right asymmetrical  Interventions: Safety awareness training; Tactile cues; Verbal cues; Visual/Demos  Therapeutic Exercises: Pain:  Pain Scale 1: Numeric (0 - 10)  Pain Intensity 1: 2  Pain Location 1: Back  Pain Intervention(s) 1: Medication (see MAR)  Activity Tolerance:   Fair   Please refer to the flowsheet for vital signs taken during this treatment. After treatment:   []         Patient left in no apparent distress sitting up in chair  [x]         Patient left in no apparent distress in bed  [x]         Call bell left within reach  [x]         Nursing notified  [x]         Caregiver present  []         Bed alarm activated    COMMUNICATION/EDUCATION:   [x]         Fall prevention education was provided and the patient/caregiver indicated understanding. [x]         Patient/family have participated as able in goal setting and plan of care. [x]         Patient/family agree to work toward stated goals and plan of care. []         Patient understands intent and goals of therapy, but is neutral about his/her participation. []         Patient is unable to participate in goal setting and plan of care.     Thank you for this referral.  Dhruv Serrano, PT   Time Calculation: 23 mins       Eval Complexity: History: HIGH Complexity :3+ comorbidities / personal factors will impact the outcome/ POC Exam:MEDIUM Complexity : 3 Standardized tests and measures addressing body structure, function, activity limitation and / or participation in recreation  Presentation: LOW Complexity : Stable, uncomplicated  Clinical Decision Making:Low Complexity    Overall Complexity:LOW

## 2019-12-17 NOTE — ANESTHESIA POSTPROCEDURE EVALUATION
Post-Anesthesia Evaluation and Assessment    Cardiovascular Function/Vital Signs  Visit Vitals  /69   Pulse 86   Temp 36.9 °C (98.4 °F)   Resp 12   Ht 5' 8\" (1.727 m)   Wt 118.2 kg (260 lb 8 oz)   SpO2 100%   BMI 39.61 kg/m²       Patient is status post Procedure(s):  SPINE DECOMPRESSION AND FUSION OF LUMBAR 4- LUMBAR 5 WITH TRANSFORAMINAL LUMBAR INTERBODY FUSION AND LEFT ILIAC CREST BONE MARROW ASPIRATION WITH NEUROMONITORING WITH C-ARM. Nausea/Vomiting: Controlled. Postoperative hydration reviewed and adequate. Pain:  Pain Scale 1: Visual (12/17/19 1219)  Pain Intensity 1: 1 (12/17/19 1219)   Managed. Neurological Status:   Neuro (WDL): Exceptions to WDL (12/17/19 1134)   At baseline. Mental Status and Level of Consciousness: Baseline and appropriate for discharge. Pulmonary Status:   O2 Device: Nasal cannula (12/17/19 1213)   Adequate oxygenation and airway patent. Complications related to anesthesia: None    Post-anesthesia assessment completed. No concerns. Patient has met all discharge requirements.     Signed By: Kimberlee Ken MD    December 17, 2019

## 2019-12-17 NOTE — INTERVAL H&P NOTE
H&P Update: 
Leon Andrea was seen and examined. History and physical has been reviewed. The patient has been examined.  There have been no significant clinical changes since the completion of the originally dated History and Physical.

## 2019-12-17 NOTE — PROGRESS NOTES
Problem: Falls - Risk of  Goal: *Absence of Falls  Description  Document [facility] Fall Risk and appropriate interventions in the flowsheet.   Outcome: Progressing Towards Goal  Note: Fall Risk Interventions:  Mobility Interventions: Patient to call before getting OOB, Utilize walker, cane, or other assistive device         Problem: Pain  Goal: *Control of Pain  Outcome: Progressing Towards Goal

## 2019-12-17 NOTE — ANESTHESIA PREPROCEDURE EVALUATION
Relevant Problems   No relevant active problems       Anesthetic History               Review of Systems / Medical History  Patient summary reviewed and nursing notes reviewed    Pulmonary  Within defined limits                 Neuro/Psych         TIA and psychiatric history     Cardiovascular                  Exercise tolerance: >4 METS     GI/Hepatic/Renal     GERD           Endo/Other      Hypothyroidism  Arthritis     Other Findings              Physical Exam    Airway  Mallampati: III  TM Distance: 4 - 6 cm  Neck ROM: normal range of motion   Mouth opening: Normal     Cardiovascular  Regular rate and rhythm,  S1 and S2 normal,  no murmur, click, rub, or gallop             Dental  No notable dental hx       Pulmonary  Breath sounds clear to auscultation               Abdominal  GI exam deferred       Other Findings            Anesthetic Plan    ASA: 3  Anesthesia type: general          Induction: Intravenous  Anesthetic plan and risks discussed with: Patient

## 2019-12-17 NOTE — PROGRESS NOTES
conducted a pre-surgery visit with Cade Olea, who is a 76 y.o.,female. The  provided the following Interventions:  Initiated a relationship of care and support. Offered prayer and assurance of continued prayers on patient's behalf. Plan:  Chaplains will continue to follow and will provide pastoral care on an as needed/requested basis.  recommends bedside caregivers page  on duty if patient shows signs of acute spiritual or emotional distress.       [address] [facility]   [phone]

## 2019-12-17 NOTE — OP NOTES
FULL OPERATIVE NOTE    Date of Procedure: 12/17/2019   Preoperative Diagnosis: SPONDYLOLISTHESIS, LUMBAR REGION  Postoperative Diagnosis: SPONDYLOLISTHESIS, LUMBAR REGION    Procedure(s):  SPINE DECOMPRESSION AND FUSION OF LUMBAR 4- LUMBAR 5 WITH TRANSFORAMINAL LUMBAR INTERBODY FUSION AND LEFT ILIAC CREST BONE MARROW ASPIRATION WITH NEUROMONITORING WITH C-ARM    1. L4-5 posterior decompression with laminectomy  2. L4-5 posterior instrumentation, pedicle screws  3. L4-5 arthrodesis, posterolateral  4. L4-5 interbody fusion with expandable cage instrumentation, LEFT sided insertion  5. Neuromonitoring  6. Application of local autograft and allograft  7. Left posterior iliac crest bone marrow aspiration    Surgeon(s) and Role:     Idalia Ledezma MD - Primary         Surgical Assistant: Magdi Freeman    Surgical Staff:  Circ-1: Elan Bradford RN  Circ-2: Ramona Baldwin RN  Radiology Technician: Kota Lopez RT, R  Scrub Tech-1: Jenna Ge  Surg Asst-1: Magdi Freeman    Event Time In Time Out   Incision Start 8966    Incision Close       1110    Anesthesia: General   Estimated Blood Loss: 400cc  Specimens: * No specimens in log *   Findings: Severe L4-5 stenosis with spondylolithesis   Complications: NIL  Implants:     Implant Name Type Inv.  Item Serial No.  Lot No. LRB No. Used Action   GRAFT BNE PUTTY BIOACTV 7.5GM -- SIGNAFUSE - [id number]  GRAFT BNE PUTTY BIOACTV 7.5GM -- [address] [id number] N/A 1 Implanted   GRAFT BNE PUTTY BIOACTV 15GM -- SIGNAFUSE - [id number]  GRAFT BNE PUTTY BIOACTV 15GM -- SIGNAFUSE  [facility] [id number] N/A 1 Implanted   INTERFACE BIOACTIVE BONE GRAFT    [employer] [id number] N/A 2 Implanted   Capsure Screw 7.5x50     000 N/A 3 Implanted   Capsure Locking Cap    SPINEWAVE 000 N/A 4 Implanted   Capsure Screw 7.5x35    SPINEWAVE 000 N/A 1 Implanted   GRAFT BNE SUB GRAN 2-4MM 5ML -- OSTEOAMP - [id number]  GRAFT BNE SUB GRAN 2-4MM 5ML -- OSTEOAMP [id number] [employer]  N/A 1 Implanted   Velocity Expandable Interbody Device    SPINEWAVE [id number] N/A 1 Implanted   Capsure Sorin 40    SPINEWAVE 000 N/A 1 Implanted   Capsure Sorin 45    SPINEWAVE 000 N/A 1 Implanted     PROCEDURE NOTE:    The patient was brought to the operating room. A surgical pause was performed to ensure the correct patient, correct operation, availability of implants, and the correct levels. The patient received the benefits of general anesthesia, was then intubated. Dior catheter was inserted. Neuromonitoring leads were applied. The patient received 3 g of intravenous Ancef preoperatively as well as 1 g of tranexamic acid. The patient was then positioned prone on radiolucent Gianluca table with bolsters to the chest and pelvis. The eyes, the abdomen as well as the genitalia were free. All bony prominences were well padded. The posterior aspect of the lumbar spine was then prepped and draped in standard fashion. Sterile field was achieved. Final time-out was performed to ensure the correct patient, correct operation as well as availability of implants. A longitudinal skin incision was made over the midline lumbar spine. Skin and subcutaneous tissues were carefully divided. Marginal exposure with spinous processes was performed and the levels were localized to fluoroscopic guidance. Subperiosteal dissection and lateral expansion of the deep dissection were carried out. Meticulous hemostasis was achieved. We then began the insertion of the pedicle screws in a standard trajectory under fluoroscopic guidance. This was done with burring the start point and then using a combination of the awl and the pedicle finder under navigation to cannulate our pedicle screw trajectories. Appropriate length and diameter pedicle screw was then inserted bilaterally at L4 and L5, there was excellent purchase.      The screw position was then confirmed radiographically. Neuromonitoring was stable and at baseline and all screws were quiet to electrical stimulation at 20mAmps. Under X-Ray guidance a 5mm incision was made over the left posterior iliac crest. I inserted an bone marrow aspiration needle into the posterior crest and aspirated 60cc of blood. This was then prepared and centrifuged and 10cc of highly concentrated bone marrow aspirate was collected and saved. This was mixed with Bioventus DBM to arrive at a putty. We then began the decompression process. This was performed sequentially with rongeur as well as drills as well as #3, #2 Kerrison side to remove the dorsal aspect of the central lamina. This was then expanded laterally to the medial margins of the lateral recesses. The junction between the dura as well as ligamentum flavum was defined using a Woodsen elevator. We used a combination of the 15-Blade as well as #2 Kerrison to remove the entirety of the dorsal ligamentum flavum, which was significantly hypertrophied. This was then extended laterally over the lateral recesses. There was evidence of significant stenosis. The decompression was performed until the shoulder of the traversing L5 nerve root was identified and effectively decompressed. After this was performed, we interrogated the entire extent of decompression with a nerve hook and no remaining stenosis was present. I then extended the LEFT sided decompression transversely over the L4 pars and removed the LEFT L4 inferior articular facet. The superior tip of the LEFT L5 superior articular facet was also removed with an #2 Kerrison. The LEFT L4 foramen was nicely decompressed and the exiting nerve root was identified. We then carefully retracted the thecal sac and exposed the L4-5 disc space. A box shaped annulotomy was performed.  The near total discectomy was carried out in a standard fashion with rotating tommy and curettes. The disc space was then sized with good interference fit via the feel of the rotating tommy and fluoroscopic parameters. The disc space was then copiously irrigated. After I was happy with the disc preparation, I packed the prepared space with a combination of local bone graft and bone marrow aspirate concentrate mixed with Bioventus DBM. I then inserted the cage under fluoroscopic guidance and expanded it up to 13mm. Neuromonitoring remained stable and baseline at this time. We then burred down the facets and the transverse processes and to ensure maximal cancellous bleeding bone. We applied local bone graft and bone marrow aspirate concentrate mixed with Bioventus Signafuse into the posterolateral gutters. We then inserted appropriate length precontoured 5.5 mm elizabeth and secured it to the screws with endcaps and finally tightened to [address] [address]. Final x-rays were taken to ensure the appropriate length and position of the hardware. The wound was then copiously irrigated. Flowseal and Patties was used to stop any further epidural venous bleeding. It was closed in layered fashion with medium Hemovac drain inserted in situ. The fascia was closed with #1 Vicryl. A generous paraspinal block was performed using Exparel. The subcutaneous layer was closed with 2-0 Vicryl as well as 3-0 Monocryl to the dermal layer. Dermabond/Prineo was applied to the skin. Sterile dressing was applied. Postoperatively, the patient was then flipped back to supine position. The patient was then extubated. The patient was transferred to PACU in stable condition. Postoperatively, the patient will require 2 doses of antibiotics. The patient will have his drain removed in the morning of postoperative day 1. The patient will need physical therapy and occupational therapy to help with mobilization. All counts were correct.  Family was informed of the patient's status.     ______________      Meliza Rios MD

## 2019-12-17 NOTE — PERIOP NOTES
TRANSFER - OUT REPORT:    Verbal report given to [facility] RN(name) on Leon Andrea  being transferred to [address] [place], [place] [address]unit) for routine post - op       Report consisted of patients Situation, Background, Assessment and   Recommendations(SBAR). Information from the following report(s) OR Summary, Procedure Summary, Intake/Output and MAR was reviewed with the receiving nurse. Lines:   Peripheral IV 12/17/19 Hand (Active)   Site Assessment Clean, dry, & intact 12/17/2019 11:34 AM   Phlebitis Assessment 0 12/17/2019 11:34 AM   Infiltration Assessment 0 12/17/2019 11:34 AM   Dressing Status Clean, dry, & intact 12/17/2019 11:34 AM   Dressing Type Transparent;Tape 12/17/2019 11:34 AM   Hub Color/Line Status Green; Infusing 12/17/2019  6:34 AM        Opportunity for questions and clarification was provided.       Patient transported with:   O2 @ 2 liters  Registered Nurse  [facility]

## 2019-12-17 NOTE — BRIEF OP NOTE
BRIEF OPERATIVE NOTE    Date of Procedure: 12/17/2019   Preoperative Diagnosis: SPONDYLOLISTHESIS, LUMBAR REGION  Postoperative Diagnosis: SPONDYLOLISTHESIS, LUMBAR REGION    Procedure(s):  SPINE DECOMPRESSION AND FUSION OF LUMBAR 4- LUMBAR 5 WITH TRANSFORAMINAL LUMBAR INTERBODY FUSION AND LEFT ILIAC CREST BONE MARROW ASPIRATION WITH NEUROMONITORING WITH C-ARM  Surgeon(s) and Role:     Tru Bird MD - Primary         Surgical Assistant: Brian Luevano    Surgical Staff:  Circ-1: Donna Whelan RN  Circ-2: Martha Diaz RN  Radiology Technician: Portillo Álvarez RT, R  Scrub Tech-1: Haley Callaway  Surg Asst-1: Brian Luevano    Event Time In Time Out   Incision Start 2443    Incision Close       1110    Anesthesia: General   Estimated Blood Loss: 400cc  Specimens: * No specimens in log *   Findings: Severe L4-5 stenosis with spondylolithesis   Complications: NIL  Implants:     Implant Name Type Inv.  Item Serial No.  Lot No. LRB No. Used Action   GRAFT BNE PUTTY BIOACTV 7.5GM -- SIGNAFUSE - [id number]  GRAFT BNE PUTTY BIOACTV 7.5GM -- [address] [id number] N/A 1 Implanted   GRAFT BNE PUTTY BIOACTV 15GM -- SIGNAFUSE - [id number]  GRAFT BNE PUTTY BIOACTV 15GM -- SIGNAFUSE  [employer] [id number] N/A 1 Implanted   INTERFACE BIOACTIVE BONE GRAFT    [employer] [id number] N/A 2 Implanted   Capsure Screw 7.5x50     000 N/A 3 Implanted   Capsure Locking Cap    SPINEWAVE 000 N/A 4 Implanted   Capsure Screw 7.5x35    SPINEWAVE 000 N/A 1 Implanted   GRAFT BNE SUB GRAN 2-4MM 5ML -- OSTEOAMP - [id number]  GRAFT BNE SUB GRAN 2-4MM 5ML -- OSTEOAMP [id number] [employer]  N/A 1 Implanted   Velocity Expandable Interbody Device    SPINEWAVE [id number] N/A 1 Implanted   Capsure Sorin 40    SPINEWAVE 000 N/A 1 Implanted   Capsure Sorin 45    SPINEWAVE 000 N/A 1 Implanted

## 2019-12-18 ENCOUNTER — APPOINTMENT (OUTPATIENT)
Dept: GENERAL RADIOLOGY | Age: 74
DRG: 455 | End: 2019-12-18
Attending: ORTHOPAEDIC SURGERY
Payer: MEDICARE

## 2019-12-18 VITALS
HEIGHT: 68 IN | HEART RATE: 76 BPM | SYSTOLIC BLOOD PRESSURE: 115 MMHG | TEMPERATURE: 97.4 F | DIASTOLIC BLOOD PRESSURE: 59 MMHG | WEIGHT: 260.5 LBS | OXYGEN SATURATION: 97 % | RESPIRATION RATE: 14 BRPM | BODY MASS INDEX: 39.48 KG/M2

## 2019-12-18 PROCEDURE — 97535 SELF CARE MNGMENT TRAINING: CPT

## 2019-12-18 PROCEDURE — 97166 OT EVAL MOD COMPLEX 45 MIN: CPT

## 2019-12-18 PROCEDURE — 97530 THERAPEUTIC ACTIVITIES: CPT

## 2019-12-18 PROCEDURE — 74011250637 HC RX REV CODE- 250/637: Performed by: ORTHOPAEDIC SURGERY

## 2019-12-18 PROCEDURE — 72100 X-RAY EXAM L-S SPINE 2/3 VWS: CPT

## 2019-12-18 PROCEDURE — 74011250636 HC RX REV CODE- 250/636: Performed by: ORTHOPAEDIC SURGERY

## 2019-12-18 PROCEDURE — 97116 GAIT TRAINING THERAPY: CPT

## 2019-12-18 RX ORDER — CYCLOBENZAPRINE HCL 10 MG
5 TABLET ORAL
Qty: 30 TAB | Refills: 0 | Status: SHIPPED | OUTPATIENT
Start: 2019-12-18

## 2019-12-18 RX ORDER — GABAPENTIN 100 MG/1
100 CAPSULE ORAL 3 TIMES DAILY
Qty: 40 CAP | Refills: 0 | Status: SHIPPED | OUTPATIENT
Start: 2019-12-18

## 2019-12-18 RX ORDER — CELECOXIB 200 MG/1
200 CAPSULE ORAL 2 TIMES DAILY
Qty: 60 CAP | Refills: 0 | Status: SHIPPED | OUTPATIENT
Start: 2019-12-18 | End: 2020-03-17

## 2019-12-18 RX ORDER — OXYCODONE AND ACETAMINOPHEN 5; 325 MG/1; MG/1
1 TABLET ORAL
Qty: 48 TAB | Refills: 0 | Status: SHIPPED | OUTPATIENT
Start: 2019-12-18 | End: 2019-12-21

## 2019-12-18 RX ADMIN — DEXAMETHASONE SODIUM PHOSPHATE 4 MG: 4 INJECTION, SOLUTION INTRAMUSCULAR; INTRAVENOUS at 08:26

## 2019-12-18 RX ADMIN — CELECOXIB 200 MG: 100 CAPSULE ORAL at 08:26

## 2019-12-18 RX ADMIN — LEVOTHYROXINE SODIUM 200 MCG: 100 TABLET ORAL at 08:26

## 2019-12-18 RX ADMIN — GABAPENTIN 100 MG: 100 CAPSULE ORAL at 15:32

## 2019-12-18 RX ADMIN — PANTOPRAZOLE SODIUM 40 MG: 40 TABLET, DELAYED RELEASE ORAL at 08:26

## 2019-12-18 RX ADMIN — OXYCODONE HYDROCHLORIDE AND ACETAMINOPHEN 1 TABLET: 5; 325 TABLET ORAL at 11:46

## 2019-12-18 RX ADMIN — GABAPENTIN 100 MG: 100 CAPSULE ORAL at 08:26

## 2019-12-18 RX ADMIN — Medication 2 G: at 08:26

## 2019-12-18 RX ADMIN — SIMVASTATIN 80 MG: 40 TABLET, FILM COATED ORAL at 08:26

## 2019-12-18 RX ADMIN — Medication 10 ML: at 05:55

## 2019-12-18 NOTE — PROGRESS NOTES
26- Assumed care of patient at this time. Report received from Pearle Mcardle., RN. Patient in bed. Pain 1/10. Call bell left within reach. 3211  - Patient in bed at this time. A/O x 4. Lungs clear, radial pulses clear , pedal pulses present , abdomen soft and non-distended. Bowel sounds active, 18 G IV to left hand  Intact, patent and infusing. No signs of phlebitis or infiltration noted. SCDs applied bilaterally. Skin warm and dry  with  honeycomb dressing to back CDI, Hemovac drain in place, patent, charged and draining serosanguinous fuid. Patient denies any new numbness/tingling, and states tingling to feet is getting better since surgery. Dior catheter in place, draining clear/yellow urine. Pain 1/10. Bed placed in lowest position, call bell within reach. 2409- Patient did not want to go down for xray at this time because she has not worked with PT, will put transport back in for patient to go down for xray    1020- Transport called to take patient down for xray    1021- Dior catheter removed at this time. Patient tolerated well    1147- Pain 4/10. PRN percocet 5 mg PO pain medication administered at this time. Patient has been educated on side effects. Side effect education sheets have been provided. 1430- Dr. Amelia Juarez contacted to inform of patient output of 150 ml in hemovac over 8 hours, ok to pull. 1545- This writer has reviewed discharge instructions with patient at this time. Patient has verbalized understanding. Patient was provided with care notes to include side effects of RX's. IV removed, patient tolerated well. Arm bands removed and shredded. AVS were reviewed with Isiah Bain.CAROLE.

## 2019-12-18 NOTE — PROGRESS NOTES
1940 - Bedside and Verbal shift change report given to Chapis Hutchinson RN (oncoming nurse) by Nancy Shen RN (offgoing nurse). Report included the following information SBAR, Kardex, OR Summary, Procedure Summary, Intake/Output, Accordion, Recent Results and Med Rec Status. 2055 - Pt assessed. No acute distress. Pain 2/10. See EMR for full details. 2325 - Pt reassessed, minimal changes to assessment. Given PRN meds as requested. See EMR for full details. 2271 - Pt reassessed. Minimal changes to assessment at this time. See EMR for full details. 0710 - Bedside and Verbal shift change report given to Severa Ban RN (oncoming nurse) by Chapis Hutchinson RN (offgoing nurse).  Report included the following information SBAR, Kardex, OR Summary, Procedure Summary, Accordion and Recent Results.

## 2019-12-18 NOTE — PROGRESS NOTES
Transition of Care (FELICIANO) Plan:    Chart reviewed, met with pt in room. Pt planning discharge home, states she has assistance once home. FOC offered, pt chose [address] [place] [address] [phone] for follow up; referral placed with CMS. Pt has RW for home. FELICIANO Transportation:   How is patient being transported at discharge? Family/Friend      When? Once cleared by Therapy between 12-2pm     Is transport scheduled? N/A      Follow-up appointment and transportation:   PCP/Specialist?  See AVS for Appointment         Who is transporting to the follow-up appointment? Family/Friend      Is transport for follow up appointment scheduled? N/A    Communication plan (with patient/family): Who is being called? Patient or Next of Kin? Responsible party? Patient      What number(s) is to be used? See Facesheet      What service provider is calling for [facility] services? When are they calling? 24-48 hours following discharge    Readmission Risk? (Green/Low; Yellow/Moderate; Red/High):  Green    Care Management Interventions  PCP Verified by CM:  Yes  Transition of Care Consult (CM Consult): [address]: No  Reason [facility]: Patient already serviced by other home care/hospice agency([facility])  Discharge Durable Medical Equipment: No  Physical Therapy Consult: Yes  Occupational Therapy Consult: Yes  Current Support Network: Own Home  Confirm Follow Up Transport: Family  The Patient and/or Patient Representative was Provided with a Choice of Provider and Agrees with the Discharge Plan?: Yes  Freedom of Choice List was Provided with Basic Dialogue that Supports the Patient's Individualized Plan of Care/Goals, Treatment Preferences and Shares the Quality Data Associated with the Providers?: Yes  Discharge Location  Discharge Placement: Home with home health

## 2019-12-18 NOTE — DISCHARGE INSTRUCTIONS
Patient Education        Lumbar Spinal Fusion: What to Expect at Home  Your Recovery    After surgery, you can expect your back to feel stiff and sore. You may have trouble sitting or standing in one position for very long and may need pain medicine in the weeks after your surgery. It may take 4 to 6 weeks to get back to doing simple activities, such as light housework. It may take 6 months to a year for your back to get better completely. You may need to wear a back brace while your back heals. And your doctor may have you go to physical therapy. If your job doesn't require physical labor, you will probably be able to go back to work after 1 or 2 months. If your job involves light physical labor, it may take 3 to 6 months. Most people whose jobs involved heavy labor can never return to those jobs. This care sheet gives you a general idea about how long it will take for you to recover. But each person recovers at a different pace. Follow the steps below to get better as quickly as possible. How can you care for yourself at home? Activity    · Rest when you feel tired. Getting enough sleep will help you recover.     · Try to walk each day. Start by walking a little more than you did the day before. Bit by bit, increase the amount you walk. Walking boosts blood flow and helps prevent pneumonia and constipation. Walking may also decrease your muscle soreness after surgery.     · If advised by your doctor, you may need to avoid lifting anything that would cause excessive strain on your back.  This may include a child, heavy grocery bags and milk containers, a heavy briefcase or backpack, cat litter or dog food bags, or a vacuum .     · Avoid strenuous activities, such as bicycle riding, jogging, weight lifting, or aerobic exercise, until your doctor says it is okay.     · Do not drive for 2 to 4 weeks after your surgery or until your doctor says it is okay.     · Avoid riding in a car for more than 30 minutes at a time for 2 to 4 weeks after surgery. If you must ride in a car for a longer distance, stop often to walk and stretch your legs.     · Try to change your position about every 30 minutes while sitting or standing. This will help decrease your back pain while you are healing.     · You will probably need to take at least 4 to 6 weeks off from work. It depends on the type of work you do and how you feel.     · You may have sex as soon as you feel able, but avoid positions that put stress on your back or cause pain. Diet    · You can eat your normal diet. If your stomach is upset, try bland, low-fat foods like plain rice, broiled chicken, toast, and yogurt.     · Drink plenty of fluids (unless your doctor tells you not to).     · You may notice that your bowel movements are not regular right after your surgery. This is common. Try to avoid constipation and straining with bowel movements. You may want to take a fiber supplement every day. If you have not had a bowel movement after a couple of days, ask your doctor about taking a mild laxative. Medicines    · Be safe with medicines. Take pain medicines exactly as directed. ? If the doctor gave you a prescription medicine for pain, take it as prescribed. ? If you are not taking a prescription pain medicine, ask your doctor if you can take an over-the-counter medicine.     · If your doctor prescribed antibiotics, take them as directed. Do not stop taking them just because you feel better. You need to take the full course of antibiotics.     · If you think your pain medicine is making you sick to your stomach:  ? Take your medicine after meals (unless your doctor has told you not to). ? Ask your doctor for a different pain medicine. Incision care    · You will be given specific instructions about how to care for the cuts (incisions) the doctor made. The instructions will depend on the type of materials used to close the cut.    Exercise    · Do back exercises as instructed by your doctor.     · Your doctor may advise you to work with a physical therapist to improve the strength and flexibility of your back. Other instructions    · To reduce stiffness and help sore muscles, use a warm water bottle, a heating pad set on low, or a warm cloth on your back. Do not put heat right over the incision. Do not go to sleep with a heating pad on your skin. Follow-up care is a key part of your treatment and safety. Be sure to make and go to all appointments, and call your doctor if you are having problems. It's also a good idea to know your test results and keep a list of the medicines you take. When should you call for help? Call 911 anytime you think you may need emergency care. For example, call if:    · You passed out (lost consciousness).     · You have sudden chest pain and shortness of breath, or you cough up blood.     · You are unable to move a leg at all.   [facility] your doctor now or seek immediate medical care if:    · You have pain that does not get better after you take pain pills.     · You have new or worse symptoms in your legs or buttocks. Symptoms may include:  ? Numbness or tingling. ? Weakness. ? Pain.     · You lose bladder or bowel control.     · You have loose stitches, or your incision comes open.     · You have blood or fluid draining from the incision.     · You have signs of infection, such as:  ? Increased pain, swelling, warmth, or redness. ? Pus draining from the incision. ? A fever.    Watch closely for any changes in your health, and be sure to contact your doctor if:    · You do not have a bowel movement after taking a laxative.     · You are not getting better as expected. Where can you learn more? Go to http://tavon-nya.info/. Enter U045 in the search box to learn more about \"Lumbar Spinal Fusion: What to Expect at Home. \"  Current as of: June 26, 2019  Content Version: 12.2  © 7358-5419 [employer].  Care instructions adapted under license by [employer] (which disclaims liability or warranty for this information). If you have questions about a medical condition or this instruction, always ask your healthcare professional. [address] any warranty or liability for your use of this information.

## 2019-12-18 NOTE — PROGRESS NOTES
Progress Note    Patient: Rojelio Li MRN: [id number]  SSN: [id number]    YOB: 1945  Age: 76 y.o. Sex: female      Admit Date: 12/17/2019    LOS: 1 day     Subjective:     POD1. Doing well. Pain controlled. Objective:     Vitals:    12/17/19 1500 12/17/19 1600 12/17/19 2246 12/18/19 0258   BP: 126/63 124/88 110/66 111/64   Pulse: 99 93 89 82   Resp: 14 16 14 14   Temp: 97.9 °F (36.6 °C) 97.9 °F (36.6 °C) 98.3 °F (36.8 °C) 97.6 °F (36.4 °C)   SpO2: 95% 100% 96% 96%   Weight:       Height:            Intake and Output:  Current Shift: No intake/output data recorded. Last three shifts: 12/16 1901 - 12/18 0700  In: 1800 [I.V.:1800]  Out: 1225 [Urine:1175; Drains:50]    Physical Exam:   NVI. Neurologically stable.  ICDI. Lab/Data Review: All lab results for the last 24 hours reviewed. Assessment:     Active Problems:    Lumbar stenosis with neurogenic claudication (12/17/2019)        Plan:     OT and PT to discharge and clear. Possible discharge today, maybe tomorrow. D/C hemovac and MCCORD today. Mobilize.     Signed By: Jensen Arevalo MD     December 18, 2019

## 2019-12-18 NOTE — PROGRESS NOTES
Problem: Self Care Deficits Care Plan (Adult)  Goal: *Acute Goals and Plan of Care (Insert Text)  Description  Occupational Therapy Goals  Initiated 12/18/2019 within 7 day(s). 1.  Patient will perform grooming with modified independence standing at sink for five minutes or more. 2.  Patient will perform upper body dressing with independence. 3.  Patient will perform lower body dressing with modified independence. 4.  Patient will perform toilet transfers with modified independence. 5.  Patient will perform all aspects of toileting with modified independence. 6.  Patient will participate in upper extremity therapeutic exercise/activities with independence for 10 minutes. 7.  Patient will utilize energy conservation techniques during functional activities with verbal, visual and tactile cues. OCCUPATIONAL THERAPY EVALUATION    Patient: Parish Youngblood (80 y.o. female)  Date: 12/18/2019  Primary Diagnosis: Lumbar stenosis with neurogenic claudication [M48.062]  Procedure(s) (LRB):  SPINE DECOMPRESSION AND FUSION OF LUMBAR 4- LUMBAR 5 WITH TRANSFORAMINAL LUMBAR INTERBODY FUSION AND LEFT ILIAC CREST BONE MARROW ASPIRATION WITH NEUROMONITORING WITH C-ARM (N/A) 1 Day Post-Op   Precautions:   Fall, Spinal  PLOF: mod I for transfers and independent with ADLs     ASSESSMENT :  Based on the objective data described below, the patient presents with decreased strength, endurance and balance for carryover of ADLs and transfers following above mentioned surgical procedure. Pt presented supine in bed at the beginning of session and agreed to participate with therapy. Pt participated with bed mobility, STS transfers, toilet transfers, grooming, LB dressing with AEs with mod-min A. Pt demo understanding for lumbar fusion precautions. Pt was left seated in wheel chair to go for X-rays taken at the end of session in NAD, pain 4/10.     Patient will benefit from skilled intervention to address the above impairments. Patient's rehabilitation potential is considered to be Good  Factors which may influence rehabilitation potential include:   []             None noted  []             Mental ability/status  [x]             Medical condition  []             Home/family situation and support systems  []             Safety awareness  [x]             Pain tolerance/management  []             Other:      PLAN :  Recommendations and Planned Interventions:   [x]               Self Care Training                  [x]      Therapeutic Activities  [x]               Functional Mobility Training   []      Cognitive Retraining  [x]               Therapeutic Exercises           [x]      Endurance Activities  [x]               Balance Training                    []      Neuromuscular Re-Education  []               Visual/Perceptual Training     [x]      Home Safety Training  [x]               Patient Education                   [x]      Family Training/Education  []               Other (comment):    Frequency/Duration: Patient will be followed by occupational therapy 1-2 times per day/4-7 days per week to address goals. Discharge Recommendations: Home Health  Further Equipment Recommendations for Discharge: N/A     SUBJECTIVE:   Patient stated  I am doing alright.     OBJECTIVE DATA SUMMARY:     Past Medical History:   Diagnosis Date    Arthritis     Chronic pain     lower back and left leg    GERD (gastroesophageal reflux disease)     Psychiatric disorder     depression    Stroke ([facility] [address] 2004    mini stroke    Thyroid disease     hypo     Past Surgical History:   Procedure Laterality Date    HX CATARACT REMOVAL Bilateral     HX ORTHOPAEDIC Left 1974    Exc proximal radial head    TOTAL HIP ARTHROPLASTY Left 2019    TOTAL HIP ARTHROPLASTY Right 2017    TOTAL KNEE ARTHROPLASTY Bilateral 2011     Barriers to Learning/Limitations: None  Compensate with: visual, verbal, tactile, kinesthetic cues/model    Home Situation:   Home Situation  Home Environment: Private residence  # Steps to Enter: 4  Rails to Enter: Yes  Hand Rails : Bilateral  One/Two Story Residence: One story  Living Alone: No  Support Systems: Spouse/Significant Other/Partner, Family member(s)  Patient Expects to be Discharged to[de-identified] Private residence  Current DME Used/Available at Home: [employer] [place], straight, Commode, bedside, Walker, rolling, Sharif Nickels, rollator, Shower chair  Tub or Shower Type: Shower  []  Right hand dominant   []  Left hand dominant    Cognitive/Behavioral Status:  Neurologic State: Alert  Orientation Level: Oriented X4  Cognition: Appropriate for age attention/concentration; Follows commands  Safety/Judgement: Fall prevention    Skin:  intact  Edema: none    Vision/Perceptual:    Tracking: Able to track stimulus in all quadrants w/o difficulty    Coordination: BUE  Coordination: Within functional limits  Fine Motor Skills-Upper: Left Intact; Right Intact    Gross Motor Skills-Upper: Left Intact; Right Intact  Balance:  Sitting: Intact  Standing: Intact; With support  Strength: BUE  Strength: Generally decreased, functional  Tone & Sensation: BUE  Tone: Normal  Sensation: Intact  Range of Motion: BUE  AROM: Generally decreased, functional  Functional Mobility and Transfers for ADLs:  Bed Mobility:  Rolling: Additional time;Contact guard assistance  Supine to Sit: Contact guard assistance  Sit to Supine: Contact guard assistance  Scooting: Supervision  Transfers:  Sit to Stand: Supervision  Stand to Sit: Supervision   Toilet Transfer : Supervision  ADL Assessment:   Feeding: Independent    Oral Facial Hygiene/Grooming: Supervision    Upper Body Dressing: Contact guard assistance    Lower Body Dressing:  Moderate assistance    Toileting: Contact guard assistance;Minimum assistance  ADL Intervention:  Grooming  Grooming Assistance: Supervision  Position Performed: Standing  Washing Hands: Supervision    Upper Body Dressing Assistance  Dressing Assistance: Contact guard assistance  Shirt simulation with hospital gown: Contact guard assistance    Lower Body Dressing Assistance  Dressing Assistance: Minimum assistance; Moderate assistance  Underpants: Minimum assistance  Socks: Moderate assistance  Leg Crossed Method Used: No  Position Performed: Seated edge of bed  Cues: Don;Doff  Adaptive Equipment Used: Reacher;Sock aid    Toileting  Toileting Assistance: Contact guard assistance;Minimum assistance  Bladder Hygiene: Contact guard assistance  Bowel Hygiene: Minimum assistance  Clothing Management: Minimum assistance    Cognitive Retraining  Safety/Judgement: Fall prevention    Therapeutic Exercise:    Pain:  Pain level pre-treatment: 4/10   Pain level post-treatment: 4/10   Pain Intervention(s): Medication (see MAR); Rest, Ice, Repositioning   Response to intervention: Nurse notified, See doc flow    Activity Tolerance:   Good   Please refer to the flowsheet for vital signs taken during this treatment. After treatment:   [x] Patient left in no apparent distress sitting up in wheelchair  [] Patient left in no apparent distress in bed  [] Call bell left within reach  [] Nursing notified  [x] Caregiver present  [] Bed alarm activated    COMMUNICATION/EDUCATION:   [x] Role of Occupational Therapy in the acute care setting  [x] Home safety education was provided and the patient/caregiver indicated understanding. [x] Patient/family have participated as able in goal setting and plan of care. [x] Patient/family agree to work toward stated goals and plan of care. [] Patient understands intent and goals of therapy, but is neutral about his/her participation. [] Patient is unable to participate in goal setting and plan of care. Thank you for this referral.  Laure Carrillo, OTR/L  Time Calculation: 35 mins    Eval Complexity: History: MEDIUM Complexity : Expanded review of history including physical, cognitive and psychosocial  history ;    Examination: MEDIUM Complexity : 3-5 performance deficits relating to physical, cognitive , or psychosocial skils that result in activity limitations and / or participation restrictions; Decision Making:MEDIUM Complexity : Patient may present with comorbidities that affect occupational performnce.  Miniml to moderate modification of tasks or assistance (eg, physical or verbal ) with assesment(s) is necessary to enable patient to complete evaluation

## 2019-12-18 NOTE — PROGRESS NOTES
Problem: Mobility Impaired (Adult and Pediatric)  Goal: *Acute Goals and Plan of Care (Insert Text)  Description  In 1-7 days pt will be able to perform:  STG  1. Bed mobility:  Demonstrate proper log-roll technique for safe initiation of rolling for OOB activities. 2.  Supine to sit to supine S with HR for meals. 3.  Sit to stand to sit S with RW/LSO in prep for ambulation. LT.  Gait:  Ambulate 150ft S with RW/LSO, for home/community mobility. 2.  Stair Negotiation:  Ascend/descend >3 steps CGA with HR for home entry. 3.  Activity tolerance: Tolerate up in chair 30 minutes-1 hour for ADLs. 4.  Patient/Family Education:  Patient/family to be independent with HEP for follow-up care and safe discharge. Outcome: Progressing Towards Goal   PHYSICAL THERAPY TREATMENT    Patient: Leon Andrea (64 y.o. female)  Date: 2019  Diagnosis: Lumbar stenosis with neurogenic claudication [M48.062]   <principal problem not specified>  Procedure(s) (LRB):  SPINE DECOMPRESSION AND FUSION OF LUMBAR 4- LUMBAR 5 WITH TRANSFORAMINAL LUMBAR INTERBODY FUSION AND LEFT ILIAC CREST BONE MARROW ASPIRATION WITH NEUROMONITORING WITH C-ARM (N/A) 1 Day Post-Op  Precautions: Fall, Spinal, Back  PLOF: ambulatory without AD     ASSESSMENT:  Pt supine in bed upon arrival. VC given for proper log roll tech. CGA for sit>stand with bed elevated to height of bed at home. Ambulated 260 ft with RW, steady pace, reciprocal gt pattern, no LOB or path deviations noted. Negotiated 4 steps using (B) handrails. Pt left supine in bed, SCDs donned. Call bell and tray table within reach, visitor present. Pt reports delivery of back brace to hospital room today. LTG's 1 & 2 met this session, will continue review of bed mobility and donning/doffing of LSO next session.    Progression toward goals:   [x]      Improving appropriately and progressing toward goals  []      Improving slowly and progressing toward goals  []      Not making progress toward goals and plan of care will be adjusted     PLAN:  Patient continues to benefit from skilled intervention to address the above impairments. Continue treatment per established plan of care. Discharge Recommendations:  Home Health  Further Equipment Recommendations for Discharge:  rolling walker     SUBJECTIVE:   Patient stated I feel good.     OBJECTIVE DATA SUMMARY:   Critical Behavior:  Neurologic State: Alert, Appropriate for age  Orientation Level: Oriented X4  Cognition: Appropriate decision making, Appropriate for age attention/concentration, Appropriate safety awareness  Safety/Judgement: Awareness of environment  Functional Mobility Training:  Bed Mobility:  Rolling: Contact guard assistance  Supine to Sit: Contact guard assistance  Sit to Supine: Contact guard assistance  Scooting: Independent  Transfers:  Sit to Stand: Contact guard assistance  Stand to Sit: Stand-by assistance  Balance:  Sitting: Intact  Standing: Intact; With support   Ambulation/Gait Training:  Distance (ft): 260 Feet (ft)  Assistive Device: Gait belt;Walker, rolling  Ambulation - Level of Assistance: Stand-by assistance  Stairs:  Number of Stairs Trained: 4  Stairs - Level of Assistance: Contact guard assistance  Rail Use: Both  Pain:  Pain level pre-treatment: 0/10  Pain level post-treatment: 0/10   Pain Intervention(s): Medication (see MAR); Rest, Ice, Repositioning   Response to intervention: Nurse notified, See doc flow    Activity Tolerance:   Good  Please refer to the flowsheet for vital signs taken during this treatment. After treatment:   [] Patient left in no apparent distress sitting up in chair  [x] Patient left in no apparent distress in bed  [x] Call bell left within reach  [] Nursing notified  [x] Caregiver present  [] Bed alarm activated  [x] SCDs applied      COMMUNICATION/EDUCATION:   [x]         Role of Physical Therapy in the acute care setting.   [x]         Fall prevention education was provided and the patient/caregiver indicated understanding. [x]         Patient/family have participated as able in working toward goals and plan of care. [x]         Patient/family agree to work toward stated goals and plan of care. []         Patient understands intent and goals of therapy, but is neutral about his/her participation.   []         Patient is unable to participate in stated goals/plan of care: ongoing with therapy staff.  []         Other:        Michel Moser   Time Calculation: 25 mins

## 2019-12-18 NOTE — PROGRESS NOTES
Problem: Mobility Impaired (Adult and Pediatric)  Goal: *Acute Goals and Plan of Care (Insert Text)  Description  In 1-7 days pt will be able to perform:  STG  1. Bed mobility:  Demonstrate proper log-roll technique for safe initiation of rolling for OOB activities. 2.  Supine to sit to supine S with HR for meals. 3.  Sit to stand to sit S with RW/LSO in prep for ambulation. LT.  Gait:  Ambulate 150ft S with RW/LSO, for home/community mobility. 2.  Stair Negotiation:  Ascend/descend >3 steps CGA with HR for home entry. 3.  Activity tolerance: Tolerate up in chair 30 minutes-1 hour for ADLs. 4.  Patient/Family Education:  Patient/family to be independent with HEP for follow-up care and safe discharge. 2019 1505 by Calderon Garcia  Outcome: Resolved/Met   PHYSICAL THERAPY TREATMENT AND DISCHARGE    Patient: Alison Avalos (51 y.o. female)  Date: 2019  Diagnosis: Lumbar stenosis with neurogenic claudication [M48.062]   <principal problem not specified>  Procedure(s) (LRB):  SPINE DECOMPRESSION AND FUSION OF LUMBAR 4- LUMBAR 5 WITH TRANSFORAMINAL LUMBAR INTERBODY FUSION AND LEFT ILIAC CREST BONE MARROW ASPIRATION WITH NEUROMONITORING WITH C-ARM (N/A) 1 Day Post-Op  Precautions: Fall, Spinal  PLOF: independent, ambulatory without AD, has a RW     ASSESSMENT:  Pt supine in bed upon arrival. VC and CGA needed for log roll tech. No assistance needed for sit>stand. Ambulated 220 ft with RW, steady pace, reciprocal gt pattern, no LOB or path deviations noted. Pt returned to supine in bed, SCDs donned, all needs within reach. Provided pt with back mobility handout for review at home. Pt reports delivery of LSO later today. Stair goal met previous session. PLAN:  Maximum therapeutic gains met at current level of care and patient will be discharged from physical therapy at this time.   Rationale for discharge:  [x]     Goals Achieved  []     Plateau Reached  []     Patient not participating in therapy  []     Other:  Discharge Recommendations:  Home Health  Further Equipment Recommendations for Discharge:  rolling walker     SUBJECTIVE:   Patient stated  What are these pumps for?     OBJECTIVE DATA SUMMARY:   Critical Behavior:  Neurologic State: Alert  Orientation Level: Oriented X4  Cognition: Appropriate for age attention/concentration, Follows commands  Safety/Judgement: Fall prevention  Functional Mobility Training:  Bed Mobility:  Rolling: Stand-by assistance  Supine to Sit: Contact guard assistance  Sit to Supine: Stand-by assistance  Scooting: Supervision  Transfers:  Sit to Stand: Supervision  Stand to Sit: Supervision  Balance:  Sitting: Intact  Standing: Intact; With support   Ambulation/Gait Training:  Distance (ft): 220 Feet (ft)  Assistive Device: Gait belt;Walker, rolling  Ambulation - Level of Assistance: Supervision  Pain:  Pain level pre-treatment: 0/10   Pain level post-treatment: 0/10   Pain Intervention(s): Medication (see MAR); Rest, Ice, Repositioning   Response to intervention: Nurse notified, See doc flow    Activity Tolerance:   Good  Please refer to the flowsheet for vital signs taken during this treatment. After treatment:   [] Patient left in no apparent distress sitting up in chair  [x] Patient left in no apparent distress in bed  [x] Call bell left within reach  [x] Nursing notified  [] Caregiver present  [] Bed alarm activated  [x] SCDs applied      COMMUNICATION/EDUCATION:   [x]         Role of Physical Therapy in the acute care setting. [x]         Fall prevention education was provided and the patient/caregiver indicated understanding. [x]         Patient/family have participated as able and agree with findings and recommendations. []         Patient is unable to participate in plan of care at this time.   []         Other:        Fran Beer   Time Calculation: 16 mins

## 2021-09-17 NOTE — DISCHARGE SUMMARY
Discharge Summary     Patient: Keshia Smiley MRN: [id number]  SSN: [id number]    YOB: 1945  Age: 76 y.o. Sex: female       Admit Date: 12/17/2019    Discharge Date: 12/18/2019      Admission Diagnoses: Lumbar stenosis with neurogenic claudication [M48.062]    Discharge Diagnoses:   Problem List as of 12/18/2019 Never Reviewed          Codes Class Noted - Resolved    Lumbar stenosis with neurogenic claudication ICD-10-CM: M48.062  ICD-9-CM: 724.03  12/17/2019 - Present               Discharge Condition: Good    Hospital Course: L4-5 decompression and fusion    Consults: None    Disposition: home with home health    Discharge Medications:   Current Discharge Medication List      START taking these medications    Details   oxyCODONE-acetaminophen (PERCOCET) 5-325 mg per tablet Take 1 Tab by mouth every four (4) hours as needed for Pain for up to 3 days. Max Daily Amount: 6 Tabs. Qty: 48 Tab, Refills: 0    Associated Diagnoses: Lumbar stenosis with neurogenic claudication      cyclobenzaprine (FLEXERIL) 10 mg tablet Take 0.5 Tabs by mouth three (3) times daily as needed for Muscle Spasm(s). Qty: 30 Tab, Refills: 0      celecoxib (CELEBREX) 200 mg capsule Take 1 Cap by mouth two (2) times a day for 90 days. Indications: acute pain following an operation  Qty: 60 Cap, Refills: 0      gabapentin (NEURONTIN) 100 mg capsule Take 1 Cap by mouth three (3) times daily. Max Daily Amount: 300 mg. Indications: acute pain following an operation  Qty: 40 Cap, Refills: 0    Associated Diagnoses: Lumbar stenosis with neurogenic claudication         CONTINUE these medications which have NOT CHANGED    Details   simvastatin (ZOCOR) 80 mg tablet Take 80 mg by mouth daily. levothyroxine (SYNTHROID) 200 mcg tablet Take 200 mcg by mouth daily. Biotin 2,500 mcg cap Take 1 mg by mouth daily. Omeprazole delayed release (PRILOSEC D/R) 20 mg tablet Take 20 mg by mouth daily.       nortriptyline (PAMELOR) 75 mg capsule Take 75 mg by mouth nightly. donepezil (ARICEPT) 10 mg tablet Take 10 mg by mouth nightly. multivit with calcium,iron,min [facility] MULTIPLE VITAMINS PO) Take 1 Tab by mouth daily. Activity: Activity as tolerated in brace  Diet: Regular Diet  Wound Care: Keep honeycomb dressing on. No need for dressing changes. OK to shower. No baths. Follow-up Appointments   Procedures    FOLLOW UP VISIT Appointment in: Two Weeks     Standing Status:   Standing     Number of Occurrences:   1     Order Specific Question:   Appointment in     Answer:    Two Weeks       Signed By: Shiloh Pino MD     December 18, 2019 No

## (undated) DEVICE — MARKER,SKIN,WI/RULER AND LABELS: Brand: MEDLINE

## (undated) DEVICE — DISPOSABLE HARDY BAYONET INSULATED BIPOLAR FORCEPS: Brand: INTEGRA® JARIT®

## (undated) DEVICE — 3.0MM PRECISION NEURO (MATCH HEAD)

## (undated) DEVICE — CATH IV AUTOGRD ORN 14GA 45MM -- INSYTE-N

## (undated) DEVICE — SOL IRRIGATION INJ NACL 0.9% 500ML BTL

## (undated) DEVICE — 3M(TM) TEGADERM(TM) TRANSPARENT FILM DRESSING FRAME STYLE 9505W: Brand: 3M™ TEGADERM™

## (undated) DEVICE — GAUZE,SPONGE,8"X4",12PLY,XRAY,STRL,LF: Brand: MEDLINE

## (undated) DEVICE — ADHESIVE SKIN CLOSURE 4X22 CM PREMIERPRO EXOFINFUSION DISP

## (undated) DEVICE — Z DISCONTINUED USE 2429233 DRESSING FOAM W10XL10CM 5 LAYR SELF ADH VERSATILE SAFETAC

## (undated) DEVICE — PACK PROCEDURE SURG LAMINECTOMY SPINE CUST

## (undated) DEVICE — GARMENT,MEDLINE,DVT,INT,CALF,MED, GEN2: Brand: MEDLINE

## (undated) DEVICE — STERILE POLYISOPRENE POWDER-FREE SURGICAL GLOVES: Brand: PROTEXIS

## (undated) DEVICE — MEDI-VAC NON-CONDUCTIVE SUCTION TUBING: Brand: CARDINAL HEALTH

## (undated) DEVICE — GOWN,SLEEVE,STERILE,W/CSR WRAP,1/P: Brand: MEDLINE

## (undated) DEVICE — EVACUATOR SURG 400CC PVC 3 SPR BLB FOR WND DRNGE

## (undated) DEVICE — BIPOLAR SEALER 23-312-1 AQM SBS 5.0: Brand: AQUAMANTYS™

## (undated) DEVICE — SOLUTION SCRB 4OZ 10% PVP I POVIDONE IOD TOP PAINT EXIDINE

## (undated) DEVICE — DRAPE,UTILITY,XL,4/PK,STERILE: Brand: MEDLINE

## (undated) DEVICE — SUT VCRL + 2-0 27IN CT2 UD -- 36/BX

## (undated) DEVICE — REM POLYHESIVE ADULT PATIENT RETURN ELECTRODE: Brand: VALLEYLAB

## (undated) DEVICE — SURGIFOAM SPNG SZ 100

## (undated) DEVICE — Z DISCONTINUED USE (MFG CAT 7984-37) SOLUTION IV SODIUM CHL 0.9% 100 ML INJ

## (undated) DEVICE — PREP SKN PREVAIL 40ML APPL --

## (undated) DEVICE — SUT ETHLN 3-0 18IN PC5 BLK --

## (undated) DEVICE — COVADERM PLUS: Brand: DEROYAL

## (undated) DEVICE — 4-PORT MANIFOLD: Brand: NEPTUNE 2

## (undated) DEVICE — DRESSING FOAM SELF ADH 20X10 CM ABSORBENT MEPILEX BORDER

## (undated) DEVICE — DRAPE XR C ARM 41X74IN LF --

## (undated) DEVICE — FLOSEAL HEMOSTATIC MATRIX, 5 ML: Brand: FLOSEAL

## (undated) DEVICE — DRAIN SURG 15FR SIL RND CHN W/ TRCR FULL FLUT DBL WRP TRAD

## (undated) DEVICE — Device

## (undated) DEVICE — SUTURE ABSRB BRAID COAT UD OS-6 NO 1 27IN VCRL J535H

## (undated) DEVICE — SPONGE DRAIN NONWOVEN 4X4IN -- 2/PK

## (undated) DEVICE — SUT MONOCRYL PLUS UD 4-0 --